# Patient Record
Sex: FEMALE | URBAN - METROPOLITAN AREA
[De-identification: names, ages, dates, MRNs, and addresses within clinical notes are randomized per-mention and may not be internally consistent; named-entity substitution may affect disease eponyms.]

---

## 2023-11-08 ENCOUNTER — INITIAL PRENATAL (OUTPATIENT)
Age: 28
End: 2023-11-08

## 2023-11-08 VITALS
HEART RATE: 73 BPM | BODY MASS INDEX: 34.31 KG/M2 | SYSTOLIC BLOOD PRESSURE: 120 MMHG | WEIGHT: 201 LBS | DIASTOLIC BLOOD PRESSURE: 60 MMHG | HEIGHT: 64 IN | OXYGEN SATURATION: 97 % | RESPIRATION RATE: 18 BRPM

## 2023-11-08 DIAGNOSIS — R11.2 NAUSEA AND VOMITING, UNSPECIFIED VOMITING TYPE: ICD-10-CM

## 2023-11-08 DIAGNOSIS — Z3A.08 8 WEEKS GESTATION OF PREGNANCY: Primary | ICD-10-CM

## 2023-11-08 RX ORDER — ONDANSETRON 4 MG/1
4 TABLET, ORALLY DISINTEGRATING ORAL 3 TIMES DAILY PRN
Qty: 21 TABLET | Refills: 3 | Status: SHIPPED | OUTPATIENT
Start: 2023-11-08

## 2023-11-08 RX ORDER — ASPIRIN 81 MG/1
81 TABLET ORAL DAILY
Qty: 90 TABLET | Refills: 1 | Status: SHIPPED | OUTPATIENT
Start: 2023-11-08

## 2023-11-08 SDOH — ECONOMIC STABILITY: FOOD INSECURITY: WITHIN THE PAST 12 MONTHS, THE FOOD YOU BOUGHT JUST DIDN'T LAST AND YOU DIDN'T HAVE MONEY TO GET MORE.: NEVER TRUE

## 2023-11-08 SDOH — ECONOMIC STABILITY: FOOD INSECURITY: WITHIN THE PAST 12 MONTHS, YOU WORRIED THAT YOUR FOOD WOULD RUN OUT BEFORE YOU GOT MONEY TO BUY MORE.: NEVER TRUE

## 2023-11-08 SDOH — ECONOMIC STABILITY: INCOME INSECURITY: HOW HARD IS IT FOR YOU TO PAY FOR THE VERY BASICS LIKE FOOD, HOUSING, MEDICAL CARE, AND HEATING?: NOT HARD AT ALL

## 2023-11-08 SDOH — ECONOMIC STABILITY: HOUSING INSECURITY
IN THE LAST 12 MONTHS, WAS THERE A TIME WHEN YOU DID NOT HAVE A STEADY PLACE TO SLEEP OR SLEPT IN A SHELTER (INCLUDING NOW)?: NO

## 2023-11-08 ASSESSMENT — ENCOUNTER SYMPTOMS
NAUSEA: 0
VOMITING: 0
EYES NEGATIVE: 1
RESPIRATORY NEGATIVE: 1
DIARRHEA: 0
BACK PAIN: 0

## 2023-11-08 ASSESSMENT — PATIENT HEALTH QUESTIONNAIRE - PHQ9
SUM OF ALL RESPONSES TO PHQ QUESTIONS 1-9: 0
2. FEELING DOWN, DEPRESSED OR HOPELESS: 0
SUM OF ALL RESPONSES TO PHQ QUESTIONS 1-9: 0
SUM OF ALL RESPONSES TO PHQ9 QUESTIONS 1 & 2: 0
1. LITTLE INTEREST OR PLEASURE IN DOING THINGS: 0

## 2023-11-09 LAB — SPECIMEN STATUS REPORT: NORMAL

## 2023-11-13 ENCOUNTER — TELEPHONE (OUTPATIENT)
Age: 28
End: 2023-11-13

## 2023-11-13 LAB
., LABCORP: ABNORMAL
C TRACH RRNA CVX QL NAA+PROBE: POSITIVE
CYTOLOGIST CVX/VAG CYTO: ABNORMAL
CYTOLOGY CVX/VAG DOC CYTO: ABNORMAL
CYTOLOGY CVX/VAG DOC THIN PREP: ABNORMAL
DX ICD CODE: ABNORMAL
Lab: ABNORMAL
N GONORRHOEA RRNA CVX QL NAA+PROBE: NEGATIVE
OTHER STN SPEC: ABNORMAL
STAT OF ADQ CVX/VAG CYTO-IMP: ABNORMAL
T VAGINALIS RRNA SPEC QL NAA+PROBE: POSITIVE

## 2023-11-13 RX ORDER — AZITHROMYCIN 500 MG/1
1000 TABLET, FILM COATED ORAL ONCE
Qty: 2 TABLET | Refills: 0 | Status: SHIPPED | OUTPATIENT
Start: 2023-11-13 | End: 2023-11-13

## 2023-11-13 RX ORDER — METRONIDAZOLE 500 MG/1
500 TABLET ORAL 2 TIMES DAILY
Qty: 14 TABLET | Refills: 0 | Status: SHIPPED | OUTPATIENT
Start: 2023-11-13 | End: 2023-11-20

## 2023-11-13 NOTE — TELEPHONE ENCOUNTER
----- Message from Sharon Greenwood MD sent at 11/13/2023  1:45 PM EST -----  Pap normal. Repeat 1 year. Trich positive: Flagyl 500 mg PO BID x 7d. Chlamydia positive: Azithromycin 1 gram PO x 1. Test of cure in 4 weeks.

## 2023-11-13 NOTE — TELEPHONE ENCOUNTER
Pt advised of +trich and +chlamydia. Per Dr. Anthony Vallejo rx for Flagyl and Azithromycin eprescribed. Pt advised to have partner treated and ANNE MARIE in 4 weeks. Pt verbalized understanding.

## 2023-11-21 DIAGNOSIS — Z34.90 PREGNANCY, UNSPECIFIED GESTATIONAL AGE: Primary | ICD-10-CM

## 2023-11-21 DIAGNOSIS — Z3A.08 8 WEEKS GESTATION OF PREGNANCY: ICD-10-CM

## 2023-11-21 LAB
ERYTHROCYTE [DISTWIDTH] IN BLOOD BY AUTOMATED COUNT: 14.7 % (ref 11.5–14.5)
HBV SURFACE AG SER QL: 0.48 INDEX
HBV SURFACE AG SER QL: NEGATIVE
HCT VFR BLD AUTO: 38 % (ref 35–47)
HEP B, EXTERNAL RESULT: NORMAL
HGB BLD-MCNC: 13 G/DL (ref 11.5–16)
HIV 1+2 AB+HIV1 P24 AG SERPL QL IA: NONREACTIVE
HIV 1/2 RESULT COMMENT: NORMAL
HIV, EXTERNAL RESULT: NORMAL
MCH RBC QN AUTO: 28.7 PG (ref 26–34)
MCHC RBC AUTO-ENTMCNC: 34.2 G/DL (ref 30–36.5)
MCV RBC AUTO: 83.9 FL (ref 80–99)
NRBC # BLD: 0 K/UL (ref 0–0.01)
NRBC BLD-RTO: 0 PER 100 WBC
PLATELET # BLD AUTO: 265 K/UL (ref 150–400)
PMV BLD AUTO: 11.8 FL (ref 8.9–12.9)
RBC # BLD AUTO: 4.53 M/UL (ref 3.8–5.2)
RPR, EXTERNAL RESULT: NORMAL
RUBELLA TITER, EXTERNAL RESULT: NORMAL
RUBV IGG SERPL IA-ACNC: NORMAL IU/ML
WBC # BLD AUTO: 8.6 K/UL (ref 3.6–11)

## 2023-11-22 LAB
ABO + RH BLD: NORMAL
BACTERIA SPEC CULT: NORMAL
BLOOD BANK CMNT PATIENT-IMP: NORMAL
BLOOD GROUP ANTIBODIES SERPL: NORMAL
CC UR VC: NORMAL
RPR SER QL: NONREACTIVE
SERVICE CMNT-IMP: NORMAL
SPECIMEN EXP DATE BLD: NORMAL

## 2023-11-22 RX ORDER — FAMOTIDINE 20 MG/1
20 TABLET, FILM COATED ORAL 2 TIMES DAILY
Qty: 180 TABLET | Refills: 1 | Status: SHIPPED | OUTPATIENT
Start: 2023-11-22

## 2023-11-22 RX ORDER — NITROFURANTOIN 25; 75 MG/1; MG/1
100 CAPSULE ORAL 2 TIMES DAILY
Qty: 14 CAPSULE | Refills: 0 | Status: SHIPPED | OUTPATIENT
Start: 2023-11-22 | End: 2023-11-29

## 2023-11-27 LAB
Lab: NORMAL
NTRA FETAL FRACTION: NORMAL
NTRA GENDER OF FETUS: NORMAL
NTRA MONOSOMY X AGE-BASED RISK TEXT: NORMAL
NTRA MONOSOMY X RESULT TEXT: NORMAL
NTRA MONOSOMY X RISK SCORE TEXT: NORMAL
NTRA TRIPLOIDY RESULT TEXT: NORMAL
NTRA TRISOMY 13 AGE-BASED RISK TEXT: NORMAL
NTRA TRISOMY 13 RESULT TEXT: NORMAL
NTRA TRISOMY 13 RISK SCORE TEXT: NORMAL
NTRA TRISOMY 18 AGE-BASED RISK TEXT: NORMAL
NTRA TRISOMY 18 RESULT TEXT: NORMAL
NTRA TRISOMY 18 RISK SCORE TEXT: NORMAL
NTRA TRISOMY 21 AGE-BASED RISK TEXT: NORMAL
NTRA TRISOMY 21 RESULT TEXT: NORMAL
NTRA TRISOMY 21 RISK SCORE TEXT: NORMAL

## 2023-12-05 LAB
Lab: NEGATIVE
Lab: NORMAL
NTRA ALPHA-THALASSEMIA: NEGATIVE
NTRA BETA-HEMOGLOBINOPATHIES: NEGATIVE
NTRA CANAVAN DISEASE: NEGATIVE
NTRA CYSTIC FIBROSIS: NEGATIVE
NTRA DUCHENNE/BECKER MUSCULAR DYSTROPHY: NEGATIVE
NTRA FAMILIAL DYSAUTONOMIA: NEGATIVE
NTRA FRAGILE X SYNDROME: NEGATIVE
NTRA GALACTOSEMIA: NEGATIVE
NTRA GAUCHER DISEASE: NEGATIVE
NTRA MEDIUM CHAIN ACYL-COA DEHYDROGENASE DEFICIENCY: NEGATIVE
NTRA POLYCYSTIC KIDNEY DISEASE, AUTOSOMAL RECESSIVE: NEGATIVE
NTRA SMITH-LEMLI-OPITZ SYNDROME: NEGATIVE
NTRA SPINAL MUSCULAR ATROPHY: NEGATIVE
NTRA TAY-SACHS DISEASE: NEGATIVE

## 2023-12-06 ENCOUNTER — ROUTINE PRENATAL (OUTPATIENT)
Age: 28
End: 2023-12-06

## 2023-12-06 VITALS — SYSTOLIC BLOOD PRESSURE: 130 MMHG | WEIGHT: 197 LBS | BODY MASS INDEX: 33.81 KG/M2 | DIASTOLIC BLOOD PRESSURE: 78 MMHG

## 2023-12-06 DIAGNOSIS — Z3A.08 8 WEEKS GESTATION OF PREGNANCY: Primary | ICD-10-CM

## 2023-12-06 DIAGNOSIS — Z23 INFLUENZA VACCINE ADMINISTERED: ICD-10-CM

## 2023-12-06 DIAGNOSIS — O21.9 NAUSEA AND VOMITING DURING PREGNANCY: ICD-10-CM

## 2023-12-06 RX ORDER — PROMETHAZINE HYDROCHLORIDE 25 MG/1
25 TABLET ORAL 4 TIMES DAILY PRN
Qty: 20 TABLET | Refills: 0 | Status: SHIPPED | OUTPATIENT
Start: 2023-12-06 | End: 2023-12-13

## 2023-12-06 RX ORDER — ONDANSETRON 4 MG/1
4 TABLET, ORALLY DISINTEGRATING ORAL 3 TIMES DAILY PRN
Qty: 21 TABLET | Refills: 3 | Status: SHIPPED | OUTPATIENT
Start: 2023-12-06

## 2023-12-06 NOTE — PROGRESS NOTES
Prenatal Visit    Chief Complaint:   Chief Complaint   Patient presents with    Routine Prenatal Visit       HPI:    Pablo Lomeli is a 29 y.o. [id number] More than one Race  Unavailable  female  at 12w2d weeks. Patient reports persistent nausea and vomiting despite taking famotidine and zofran. She wakes up nauseated and will vomit. Then will take her zofran and go back to sleep. She did not start diclegis. Completed her antibiotics for Chlamydia and Trich. ANNE MARIE with next visit. There was Negative fetal movements. The prenatal blood work testing was reviewed and found to be normal.  Dates were reviewed with the patient. OB History    Para Term  AB Living   5 3 3   1 3   SAB IAB Ectopic Molar Multiple Live Births     1       3      # Outcome Date GA Lbr Venkata/2nd Weight Sex Delivery Anes PTL Lv   5 Current            4 IAB            3 Term     M Vag-Spont   XIOMARA   2 Term     M Vag-Spont   XIOMARA   1 Term     M Vag-Spont   XIOMARA       Mother's Prenatal Vitals  BP: 130/78  Weight - Scale: 89.4 kg (197 lb)  Alb/Glu  Albumin: Negative  Glucose: Negative    Vitals:  Vitals:    23 1403   BP: 130/78        FHT: + on v-scan    Assessment:  Pablo Lomeli is a 29 y.o. Verlon Andrzej than one Race  Unavailable female at 12w2d weeks. Pregnancy complicated by  STD and persistent nausea/vomiting of pregnancy . Currently doing  stable . Plan:   - A single marker MSAFP will be ordered for a 15-20 week gestational age window. -  An 18-22 week anatomy ultrasound has been ordered. - Discussed expectations at current gestation. Tylenol/heat/ice rest for discomforts noted in pregnancy   - Labs needed:  ANNE MARIE for Chlamydia and trich. AFP next visit  -The patient will return to the office for her next visit in 4 weeks. - See antepartum flow sheet. - Refilled zofran and diclegis. Start promethazine for nausea. [place] diet.      Jose Armando Solis MD

## 2023-12-22 DIAGNOSIS — O21.9 NAUSEA AND VOMITING DURING PREGNANCY: ICD-10-CM

## 2023-12-22 DIAGNOSIS — Z3A.08 8 WEEKS GESTATION OF PREGNANCY: ICD-10-CM

## 2023-12-22 RX ORDER — ONDANSETRON 4 MG/1
4 TABLET, ORALLY DISINTEGRATING ORAL 3 TIMES DAILY PRN
Qty: 21 TABLET | Refills: 3 | OUTPATIENT
Start: 2023-12-22

## 2024-01-05 ENCOUNTER — ROUTINE PRENATAL (OUTPATIENT)
Age: 29
End: 2024-01-05

## 2024-01-05 VITALS — BODY MASS INDEX: 34.84 KG/M2 | DIASTOLIC BLOOD PRESSURE: 60 MMHG | WEIGHT: 203 LBS | SYSTOLIC BLOOD PRESSURE: 108 MMHG

## 2024-01-05 DIAGNOSIS — A59.9 TRICHOMONIASIS: ICD-10-CM

## 2024-01-05 DIAGNOSIS — Z11.3 SCREENING FOR STDS (SEXUALLY TRANSMITTED DISEASES): ICD-10-CM

## 2024-01-05 DIAGNOSIS — Z3A.08 8 WEEKS GESTATION OF PREGNANCY: ICD-10-CM

## 2024-01-05 DIAGNOSIS — Z34.90 PREGNANCY, UNSPECIFIED GESTATIONAL AGE: ICD-10-CM

## 2024-01-05 DIAGNOSIS — A74.9 CHLAMYDIA: ICD-10-CM

## 2024-01-05 DIAGNOSIS — Z3A.08 8 WEEKS GESTATION OF PREGNANCY: Primary | ICD-10-CM

## 2024-01-05 LAB
C. TRACHOMATIS, EXTERNAL RESULT: NORMAL
N. GONORRHOEAE, EXTERNAL RESULT: NORMAL
TSH SERPL DL<=0.05 MIU/L-ACNC: 0.84 UIU/ML (ref 0.36–3.74)

## 2024-01-05 NOTE — PROGRESS NOTES
Prenatal Visit    Chief Complaint:   Chief Complaint   Patient presents with    Routine Prenatal Visit       HPI:    Alejandrina Huang is a 28 y.o.  More than one Race  Unavailable  female  at 16w4d weeks. Patient reports she is feeling better. Nausea has resolved. There was Positive fetal movements. Patient reports no problems with contractions, vaginal bleeding or loss of fluid.   Due for ANNE MARIE today for Chlamydia and Trich. Also due for AFP.     The prenatal blood work testing was reviewed and found to be normal.      OB History    Para Term  AB Living   5 3 3   1 3   SAB IAB Ectopic Molar Multiple Live Births     1       3      # Outcome Date GA Lbr Venkata/2nd Weight Sex Delivery Anes PTL Lv   5 Current            4 IAB            3 Term     M Vag-Spont   XIOMARA   2 Term     M Vag-Spont   XIOMARA   1 Term     M Vag-Spont   XIOMARA       Mother's Prenatal Vitals  BP: 108/60  Weight - Scale: 92.1 kg (203 lb)  Alb/Glu  Albumin: Negative  Glucose: Negative    Vitals:  Vitals:    24 1400   BP: 108/60        FHT: 160    Assessment:  Alejandrina Huang is a 28 y.o. [id number] than one Race  Unavailable female at 16w4d weeks. . Currently doing well.      Plan:   Discussed expectations at current gestation. Tylenol/heat/ice rest for discomforts noted in pregnancy  Labs needed:  AFP, Nuswab  The patient will return to the office for her next visit in 4 weeks.   Anatomy US next visit.     Henrique Gonzales MD

## 2024-01-10 LAB
AFP INTERP SERPL-IMP: NORMAL
AFP MOM SERPL: 0.98
AFP SERPL-MCNC: 29.6 NG/ML
AGE AT DELIVERY: 29.1 YR
AGE OF EGG DONOR: NORMAL
AGE OF EGG DONOR: NORMAL
C TRACH RRNA SPEC QL NAA+PROBE: NEGATIVE
COMMENT: NORMAL
DONOR EGG?: 4
DONOR EGG?: NO
FAMILY HISTORY NTD: NORMAL
FHX NTD (Y OR N): NORMAL
GA METHOD: NORMAL
GA: 16.6 WEEKS
IDDM PATIENT QL: NO
INSULIN DEP. DIABETIC: NORMAL
Lab: 203
Lab: NORMAL
Lab: NORMAL
MAT SCN FOR FETAL ABNORMALITIES SERPL: NORMAL
MULTIPLE PREGNANCY: NO
N GONORRHOEA RRNA SPEC QL NAA+PROBE: NEGATIVE
NEURAL TUBE DEFECT RISK FETUS: NORMAL
NUMBER OF FETUSES: NO
OTHER INDICATIONS: NORMAL
OTHER INDICATIONS: NORMAL
PREVIOUSLY ELEVATED AFP (Y OR N): 16
PREVIOUSLY ELEVATED AFP (Y OR N): NO
PRIOR 1ST TRIM TESTING ?: NORMAL
PRIOR 2ND TRIM TESTING ?: NORMAL
PRIOR DS/NTD SCREEN CURRENT PREGNANCY?: NORMAL
PRIOR DS/NTD SCREEN CURRENT PREGNANCY?: NORMAL
PRIOR FIRST TRIMESTER TESTING (Y OR N ): NORMAL
PRIOR PREGNANCY WITH DOWN SYNDROME (Y OR N): 1
PRIOR PREGNANCY WITH DOWN SYNDROME (Y OR N): NORMAL
T VAGINALIS RRNA SPEC QL NAA+PROBE: NEGATIVE
TYPE OF EGG DONOR: NORMAL
TYPE OF EGG DONOR: NORMAL

## 2024-01-31 ENCOUNTER — ROUTINE PRENATAL (OUTPATIENT)
Age: 29
End: 2024-01-31

## 2024-01-31 VITALS — BODY MASS INDEX: 35.87 KG/M2 | DIASTOLIC BLOOD PRESSURE: 64 MMHG | WEIGHT: 209 LBS | SYSTOLIC BLOOD PRESSURE: 112 MMHG

## 2024-01-31 DIAGNOSIS — R11.0 NAUSEA: ICD-10-CM

## 2024-01-31 DIAGNOSIS — Z3A.08 8 WEEKS GESTATION OF PREGNANCY: Primary | ICD-10-CM

## 2024-01-31 PROCEDURE — 0502F SUBSEQUENT PRENATAL CARE: CPT | Performed by: OBSTETRICS & GYNECOLOGY

## 2024-01-31 RX ORDER — ONDANSETRON 4 MG/1
4 TABLET, ORALLY DISINTEGRATING ORAL 3 TIMES DAILY PRN
Qty: 21 TABLET | Refills: 0 | Status: SHIPPED | OUTPATIENT
Start: 2024-01-31

## 2024-01-31 NOTE — PROGRESS NOTES
Prenatal Visit    Chief Complaint:   Chief Complaint   Patient presents with    Routine Prenatal Visit       HPI:    Alejandrina Huang is a 28 y.o.  More than one Race  Unavailable  female  at 20w2d weeks. Patient reports Positive fetal movements. Patient reports no problems with contractions, vaginal bleeding or loss of fluid. Pt requests refill for zofran for intermittent nausea. Does note some headaches from time to time when she needs to eat.     OB History    Para Term  AB Living   5 3 3   1 3   SAB IAB Ectopic Molar Multiple Live Births     1       3      # Outcome Date GA Lbr Venkata/2nd Weight Sex Delivery Anes PTL Lv   5 Current            4 IAB            3 Term     M Vag-Spont   XIOMARA   2 Term     M Vag-Spont   XIOMARA   1 Term     M Vag-Spont   XIOMARA       Mother's Prenatal Vitals  BP: 112/64  Weight - Scale: 94.8 kg (209 lb)  Alb/Glu  Albumin: Negative  Glucose: Negative    Vitals:  Vitals:    24 1313   BP: 112/64        FHT: + on US       Fetal position: breech    Assessment:  Alejandrina Huang is a 28 y.o. [id number] than one Race  Unavailable female at 20w2d weeks.   Pregnancy complicated by treated STD, obesity.   Currently doing well.    Incomplete anatomy US: repeat in 4 weeks  Marginal cord insertion: Discussed finding and will do monitoring US at 32 wks.     Plan:   Discussed expectations at current gestation. Tylenol/heat/ice rest for discomforts noted in pregnancy  Discussed monitoring fetal movements and signs of  labor.   Labs needed today: None.  Glucola next visit.   The patient will return to the office for her next visit in 4 weeks.         Henrique Gonzales MD

## 2024-02-28 ENCOUNTER — ROUTINE PRENATAL (OUTPATIENT)
Age: 29
End: 2024-02-28

## 2024-02-28 VITALS — BODY MASS INDEX: 35.87 KG/M2 | DIASTOLIC BLOOD PRESSURE: 68 MMHG | WEIGHT: 209 LBS | SYSTOLIC BLOOD PRESSURE: 128 MMHG

## 2024-02-28 DIAGNOSIS — E66.9 CLASS 2 OBESITY WITHOUT SERIOUS COMORBIDITY WITH BODY MASS INDEX (BMI) OF 35.0 TO 35.9 IN ADULT, UNSPECIFIED OBESITY TYPE: ICD-10-CM

## 2024-02-28 DIAGNOSIS — O43.199 MARGINAL INSERTION OF UMBILICAL CORD AFFECTING MANAGEMENT OF MOTHER: ICD-10-CM

## 2024-02-28 DIAGNOSIS — Z3A.08 8 WEEKS GESTATION OF PREGNANCY: Primary | ICD-10-CM

## 2024-02-28 DIAGNOSIS — Z34.90 PREGNANCY, UNSPECIFIED GESTATIONAL AGE: ICD-10-CM

## 2024-02-28 PROCEDURE — 0502F SUBSEQUENT PRENATAL CARE: CPT | Performed by: OBSTETRICS & GYNECOLOGY

## 2024-02-28 NOTE — PROGRESS NOTES
Prenatal Visit    Chief Complaint:   Chief Complaint   Patient presents with    Routine Prenatal Visit       HPI:    Alejandrina Huang is a 28 y.o.  More than one Race  Unavailable  female  at 24w2d weeks. Patient reports Positive fetal movements. Patient reports no problems with contractions, vaginal bleeding or loss of fluid. Pt is having issues with insomnia. Pt was supposed to have follow up anatomy US today but missed her appt.     OB History    Para Term  AB Living   5 3 3   1 3   SAB IAB Ectopic Molar Multiple Live Births     1       3      # Outcome Date GA Lbr Venkata/2nd Weight Sex Delivery Anes PTL Lv   5 Current            4 IAB            3 Term     M Vag-Spont   XIOMARA   2 Term     M Vag-Spont   XIOMARA   1 Term     M Vag-Spont   XIOMARA       Mother's Prenatal Vitals  BP: 128/68  Weight - Scale: 94.8 kg (209 lb)    Vitals:  Vitals:    24 1411   BP: 128/68        Fundal Height: 24    FHT: 160    Assessment:  Alejandrina Huang is a 28 y.o. [id number] than one Race  Unavailable female at 24w2d weeks.   Pregnancy complicated by obesity and marginal cord insertion. .   Currently doing well.      Plan:   Discussed expectations at current gestation. Tylenol/heat/ice rest for discomforts noted in pregnancy  Discussed monitoring fetal movements and signs of  labor.   Labs needed: Glucola, HIV, and Syphilis testing  The patient will return to the office for her next visit in 2 weeks for follow up with repeat anatomy US.  US scheduled for 32 wk EFW/BPP. 34wk BPP, 36 wk EFW/BPP, 37-39wk BPP and 40 wk EFW/BPP for obesity and marginal cord insertion.   Discussed unisom PRN insomnia.          Henrique Gonzales MD

## 2024-02-29 LAB
ERYTHROCYTE [DISTWIDTH] IN BLOOD BY AUTOMATED COUNT: 15.1 % (ref 11.5–14.5)
GLUCOSE 1H P 100 G GLC PO SERPL-MCNC: 117 MG/DL (ref 65–140)
HCT VFR BLD AUTO: 33.1 % (ref 35–47)
HGB BLD-MCNC: 10.8 G/DL (ref 11.5–16)
HIV 1+2 AB+HIV1 P24 AG SERPL QL IA: NONREACTIVE
HIV 1/2 RESULT COMMENT: NORMAL
MCH RBC QN AUTO: 29.3 PG (ref 26–34)
MCHC RBC AUTO-ENTMCNC: 32.6 G/DL (ref 30–36.5)
MCV RBC AUTO: 89.7 FL (ref 80–99)
NRBC # BLD: 0 K/UL (ref 0–0.01)
NRBC BLD-RTO: 0 PER 100 WBC
PLATELET # BLD AUTO: 263 K/UL (ref 150–400)
PMV BLD AUTO: 11.3 FL (ref 8.9–12.9)
RBC # BLD AUTO: 3.69 M/UL (ref 3.8–5.2)
WBC # BLD AUTO: 6.3 K/UL (ref 3.6–11)

## 2024-03-01 ENCOUNTER — HOSPITAL ENCOUNTER (EMERGENCY)
Facility: HOSPITAL | Age: 29
Discharge: HOME | End: 2024-03-01
Payer: MEDICAID

## 2024-03-01 VITALS
OXYGEN SATURATION: 98 % | HEIGHT: 64 IN | BODY MASS INDEX: 35.68 KG/M2 | TEMPERATURE: 98.6 F | WEIGHT: 209 LBS | SYSTOLIC BLOOD PRESSURE: 145 MMHG | DIASTOLIC BLOOD PRESSURE: 76 MMHG | RESPIRATION RATE: 18 BRPM | HEART RATE: 92 BPM

## 2024-03-01 DIAGNOSIS — J20.8 ACUTE VIRAL BRONCHITIS: Primary | ICD-10-CM

## 2024-03-01 DIAGNOSIS — O99.332 HIGH RISK PREGNANCY DUE TO SMOKING IN SECOND TRIMESTER: ICD-10-CM

## 2024-03-01 DIAGNOSIS — O16.2 ELEVATED BLOOD PRESSURE AFFECTING PREGNANCY IN SECOND TRIMESTER, ANTEPARTUM: ICD-10-CM

## 2024-03-01 LAB — T PALLIDUM AB SER QL IA: NON REACTIVE

## 2024-03-01 PROCEDURE — 99283 EMERGENCY DEPT VISIT LOW MDM: CPT

## 2024-03-01 RX ORDER — ACETAMINOPHEN 325 MG/1
650 TABLET ORAL EVERY 6 HOURS PRN
Qty: 20 TABLET | Refills: 0 | Status: SHIPPED | OUTPATIENT
Start: 2024-03-01

## 2024-03-01 RX ORDER — DIPHENHYDRAMINE HCL 25 MG
25 CAPSULE ORAL EVERY 6 HOURS PRN
Qty: 20 CAPSULE | Refills: 0 | Status: SHIPPED | OUTPATIENT
Start: 2024-03-01 | End: 2024-03-11

## 2024-03-01 RX ORDER — GUAIFENESIN/DEXTROMETHORPHAN 100-10MG/5
5 SYRUP ORAL 3 TIMES DAILY PRN
Qty: 120 ML | Refills: 0 | Status: SHIPPED | OUTPATIENT
Start: 2024-03-01 | End: 2024-03-11

## 2024-03-01 ASSESSMENT — VISUAL ACUITY: OU: 1

## 2024-03-01 ASSESSMENT — PAIN - FUNCTIONAL ASSESSMENT: PAIN_FUNCTIONAL_ASSESSMENT: NONE - DENIES PAIN

## 2024-03-01 ASSESSMENT — ENCOUNTER SYMPTOMS: COUGH: 1

## 2024-03-01 NOTE — ED NOTES
Pt presents ambulatory to ED complaining of N/V/D, cough, ear pain, nasal congestion, and chest congestion that started Tuesday. Pt reports being around someone who had the flu. Pt reports being 24 weeks pregnant and states she has an OBGYN and takes prenatals. Pt is alert and oriented x 4, RR even and unlabored, skin is warm and dry. Assesment completed and pt updated on plan of care.       Emergency Department Nursing Plan of Care       The Nursing Plan of Care is developed from the Nursing assessment and Emergency Department Attending provider initial evaluation.  The plan of care may be reviewed in the “ED Provider note”.        Signed     LAUREN INFANTE RN    3/1/2024   5:18 PM

## 2024-03-01 NOTE — ED NOTES
Discharge instructions were given to the patient by LAUREN INFANTE RN.     The patient left the Emergency Department ambulatory, alert and oriented and in no acute distress with 3 prescriptions. The patient was encouraged to call or return to the ED for worsening issues or problems and was encouraged to schedule a follow up appointment for continuing care.     The patient verbalized understanding of discharge instructions and prescriptions, all questions were answered. The patient has no further concerns at this time.

## 2024-03-01 NOTE — ED PROVIDER NOTES
[facility] EMERGENCY DEPT  EMERGENCY DEPARTMENT ENCOUNTER       Pt Name: Alejandrina Huang  MRN: [id number]  Birthdate 1995  Date of evaluation: 3/1/2024  Provider: VANDANA Bee   PCP: None, None  Note Started: 5:26 PM EST 3/1/24     CHIEF COMPLAINT       Chief Complaint   Patient presents with    Influenza        HISTORY OF PRESENT ILLNESS: 1 or more elements      History From: Patient  HPI Limitations: None     Alejandrina Huang is a 28 y.o. female who presents ambulatory with her boyfriend with 3 to 4 days of bodyaches, cough and congestion.  Her boyfriend has had similar symptoms.  Patient is about 24 weeks pregnant.  She smokes cigarettes.     Nursing Notes were all reviewed and agreed with or any disagreements were addressed in the HPI.     REVIEW OF SYSTEMS      Review of Systems   HENT:  Positive for congestion.    Respiratory:  Positive for cough.    Musculoskeletal:  Positive for myalgias.        Positives and Pertinent negatives as per HPI.    PAST HISTORY     Past Medical History:  Past Medical History:   Diagnosis Date    Chlamydia 2023    Obesity     Trichomonal cervicitis 2023       Past Surgical History:  Past Surgical History:   Procedure Laterality Date    DILATION AND CURETTAGE OF UTERUS      Done after 2nd delivery due to retained placenta    THERAPEUTIC          Family History:  No family history on file.    Social History:  Social History     Tobacco Use    Smoking status: Former     Types: Cigarettes    Smokeless tobacco: Never   Vaping Use    Vaping Use: Every day   Substance Use Topics    Alcohol use: Not Currently    Drug use: Never       Allergies:  No Known Allergies    CURRENT MEDICATIONS      Previous Medications    ASPIRIN 81 MG EC TABLET    Take 1 tablet by mouth daily    FAMOTIDINE (PEPCID) 20 MG TABLET    Take 1 tablet by mouth 2 times daily    ONDANSETRON (ZOFRAN-ODT) 4 MG DISINTEGRATING TABLET    Take 1 tablet by mouth 3 times daily as needed for Nausea or Vomiting     [phone]   acetaminophen 325 MG tablet  diphenhydrAMINE 25 MG capsule  guaiFENesin-dextromethorphan 100-10 MG/5ML syrup           DISCONTINUED MEDICATIONS:  Current Discharge Medication List        I have seen and evaluated the patient autonomously. My supervision physician was on site and available for consultation if needed.     I am the Primary Clinician of Record.   VANDANA Bee (electronically signed)    (Please note that parts of this dictation were completed with voice recognition software. Quite often unanticipated grammatical, syntax, homophones, and other interpretive errors are inadvertently transcribed by the computer software. Please disregards these errors. Please excuse any errors that have escaped final proofreading.)       Dani Gonzalez PA  03/01/24 2544

## 2024-03-08 RX ORDER — ONDANSETRON 4 MG/1
4 TABLET, ORALLY DISINTEGRATING ORAL 3 TIMES DAILY PRN
Qty: 21 TABLET | Refills: 0 | Status: SHIPPED | OUTPATIENT
Start: 2024-03-08

## 2024-03-13 ENCOUNTER — ROUTINE PRENATAL (OUTPATIENT)
Age: 29
End: 2024-03-13

## 2024-03-13 VITALS — SYSTOLIC BLOOD PRESSURE: 118 MMHG | BODY MASS INDEX: 36.9 KG/M2 | WEIGHT: 215 LBS | DIASTOLIC BLOOD PRESSURE: 62 MMHG

## 2024-03-13 DIAGNOSIS — O43.199 MARGINAL INSERTION OF UMBILICAL CORD AFFECTING MANAGEMENT OF MOTHER: ICD-10-CM

## 2024-03-13 DIAGNOSIS — Z3A.08 8 WEEKS GESTATION OF PREGNANCY: Primary | ICD-10-CM

## 2024-03-13 DIAGNOSIS — E66.9 CLASS 2 OBESITY WITHOUT SERIOUS COMORBIDITY WITH BODY MASS INDEX (BMI) OF 35.0 TO 35.9 IN ADULT, UNSPECIFIED OBESITY TYPE: ICD-10-CM

## 2024-03-13 PROCEDURE — 0502F SUBSEQUENT PRENATAL CARE: CPT | Performed by: OBSTETRICS & GYNECOLOGY

## 2024-03-13 NOTE — PROGRESS NOTES
Prenatal Visit    Chief Complaint:   Chief Complaint   Patient presents with    Routine Prenatal Visit       HPI:    Alejandrina Huang is a 28 y.o.  More than one Race  Unavailable  female  at 26w2d weeks. Patient reports Positive fetal movements. Patient reports no problems with contractions, vaginal bleeding or loss of fluid. Pt had follow up anatomy US today that was completed and normal. Pt is interested in getting tdap and RSV vaccines. Discussed we can do tdap at next visit and RSV can get obtained from [facility].     OB History    Para Term  AB Living   5 3 3   1 3   SAB IAB Ectopic Molar Multiple Live Births     1       3      # Outcome Date GA Lbr Venkata/2nd Weight Sex Delivery Anes PTL Lv   5 Current            4 IAB            3 Term     M Vag-Spont   XIOMARA   2 Term     M Vag-Spont   XIOMARA   1 Term     M Vag-Spont   XIOMARA       Mother's Prenatal Vitals  BP: 118/62  Weight - Scale: 97.5 kg (215 lb)  Alb/Glu  Albumin: Negative  Glucose: Negative    Vitals:  Vitals:    24 1256   BP: 118/62        Fundal Height: 27    FHT: + on US      Fetal position: breech    Assessment:  Alejandrina Huang is a 28 y.o. [id number] than one Race  Unavailable female at 26w2d weeks.   Pregnancy complicated by obesity and marginal cord insertion.   Currently doing well.      Plan:   Discussed expectations at current gestation. Tylenol/heat/ice rest for discomforts noted in pregnancy  Discussed monitoring fetal movements and signs of  labor.   Labs needed: None  The patient will return to the office for her next visit in 4 weeks.   Monitoring US starting at 32 wks.         Henrique Gonzales MD

## 2024-03-27 RX ORDER — ONDANSETRON 4 MG/1
4 TABLET, ORALLY DISINTEGRATING ORAL 3 TIMES DAILY PRN
Qty: 21 TABLET | Refills: 0 | Status: SHIPPED | OUTPATIENT
Start: 2024-03-27

## 2024-03-29 ENCOUNTER — ROUTINE PRENATAL (OUTPATIENT)
Age: 29
End: 2024-03-29
Payer: MEDICAID

## 2024-03-29 VITALS — WEIGHT: 210 LBS | BODY MASS INDEX: 36.05 KG/M2 | SYSTOLIC BLOOD PRESSURE: 108 MMHG | DIASTOLIC BLOOD PRESSURE: 60 MMHG

## 2024-03-29 DIAGNOSIS — Z34.90 PREGNANCY, UNSPECIFIED GESTATIONAL AGE: ICD-10-CM

## 2024-03-29 DIAGNOSIS — Z3A.08 8 WEEKS GESTATION OF PREGNANCY: Primary | ICD-10-CM

## 2024-03-29 DIAGNOSIS — O43.199 MARGINAL INSERTION OF UMBILICAL CORD AFFECTING MANAGEMENT OF MOTHER: ICD-10-CM

## 2024-03-29 PROCEDURE — 0502F SUBSEQUENT PRENATAL CARE: CPT | Performed by: OBSTETRICS & GYNECOLOGY

## 2024-03-29 PROCEDURE — 90715 TDAP VACCINE 7 YRS/> IM: CPT | Performed by: OBSTETRICS & GYNECOLOGY

## 2024-03-29 PROCEDURE — 90471 IMMUNIZATION ADMIN: CPT | Performed by: OBSTETRICS & GYNECOLOGY

## 2024-03-29 NOTE — PROGRESS NOTES
Prenatal Visit    Chief Complaint:   Chief Complaint   Patient presents with    Routine Prenatal Visit       HPI:    Alejandrina Huang is a 28 y.o.  More than one Race  Unavailable  female  at 28w4d weeks. Patient reports Positive fetal movements. Patient reports no problems with contractions, vaginal bleeding or loss of fluid. Discussed getting tdap today. Pt agrees. Pt to get belly band order from her insurance sent to the office.     OB History    Para Term  AB Living   5 3 3   1 3   SAB IAB Ectopic Molar Multiple Live Births     1       3      # Outcome Date GA Lbr Venkata/2nd Weight Sex Delivery Anes PTL Lv   5 Current            4 IAB            3 Term     M Vag-Spont   XIOMARA   2 Term     M Vag-Spont   XIOMARA   1 Term     M Vag-Spont   XIOMRAA       Mother's Prenatal Vitals  BP: 108/60  Weight - Scale: 95.3 kg (210 lb)  Alb/Glu  Albumin: Negative  Glucose: Negative    Vitals:  Vitals:    24 1328   BP: 108/60        Fundal Height: 28    FHT: 150      Assessment:  Alejandrina Huang is a 28 y.o. [id number] than one Race  Unavailable female at 28w4d weeks.   Pregnancy complicated by obesity.   Currently doing well.      Plan:   Discussed expectations at current gestation. Tylenol/heat/ice rest for discomforts noted in pregnancy  Discussed monitoring fetal movements and signs of  labor.   Labs needed: None  The patient will return to the office for her next visit in 2 weeks.  Monitoring US start at 32 weeks  Tdap today.          Henrique Gonzales MD

## 2024-04-01 ENCOUNTER — TELEPHONE (OUTPATIENT)
Age: 29
End: 2024-04-01

## 2024-04-01 NOTE — TELEPHONE ENCOUNTER
Call patient to inform her of Dr Gonzales's schedule change on May 8. Informed patient she could come to [facility] to stay on schedule for ultrasound and follow up, with midwife. Left detailed message with call back number.

## 2024-04-10 RX ORDER — ONDANSETRON 4 MG/1
4 TABLET, ORALLY DISINTEGRATING ORAL 3 TIMES DAILY PRN
Qty: 21 TABLET | Refills: 1 | OUTPATIENT
Start: 2024-04-10

## 2024-04-12 ENCOUNTER — ROUTINE PRENATAL (OUTPATIENT)
Age: 29
End: 2024-04-12

## 2024-04-12 VITALS — SYSTOLIC BLOOD PRESSURE: 120 MMHG | WEIGHT: 213 LBS | BODY MASS INDEX: 36.56 KG/M2 | DIASTOLIC BLOOD PRESSURE: 70 MMHG

## 2024-04-12 DIAGNOSIS — E66.9 CLASS 2 OBESITY WITHOUT SERIOUS COMORBIDITY WITH BODY MASS INDEX (BMI) OF 36.0 TO 36.9 IN ADULT, UNSPECIFIED OBESITY TYPE: ICD-10-CM

## 2024-04-12 DIAGNOSIS — Z3A.08 8 WEEKS GESTATION OF PREGNANCY: Primary | ICD-10-CM

## 2024-04-12 DIAGNOSIS — O43.199 MARGINAL INSERTION OF UMBILICAL CORD AFFECTING MANAGEMENT OF MOTHER: ICD-10-CM

## 2024-04-12 RX ORDER — ONDANSETRON 4 MG/1
4 TABLET, ORALLY DISINTEGRATING ORAL 3 TIMES DAILY PRN
Qty: 21 TABLET | Refills: 0 | Status: SHIPPED | OUTPATIENT
Start: 2024-04-12

## 2024-04-12 NOTE — PROGRESS NOTES
Prenatal Visit    Chief Complaint:   Chief Complaint   Patient presents with    Routine Prenatal Visit       HPI:    Alejandrina Huang is a 28 y.o.  More than one Race  Unavailable  female  at 30w4d weeks. Patient reports Positive fetal movements. Patient reports no problems with contractions, vaginal bleeding or loss of fluid. Needs refill for zofran. Sent this morning.     OB History    Para Term  AB Living   5 3 3   1 3   SAB IAB Ectopic Molar Multiple Live Births     1       3      # Outcome Date GA Lbr Venkata/2nd Weight Sex Delivery Anes PTL Lv   5 Current            4 IAB            3 Term     M Vag-Spont   XIOMARA   2 Term     M Vag-Spont   XIOMARA   1 Term     M Vag-Spont   XIOMARA       Mother's Prenatal Vitals  BP: 120/70  Weight - Scale: 96.6 kg (213 lb)  Prenatal Fetal Information  Fundal Height (cm): 30 cm  Fetal HR: 155  Movement: Present    Cervix check: no      Fetal position: uncertain lie    Assessment:  Alejandrina Huang is a 28 y.o. [id number] than one Race  Unavailable female at 30w4d weeks.   Pregnancy complicated by obesity, marginal cord insertion.   Currently doing well.      Plan:   Discussed expectations at current gestation. Tylenol/heat/ice rest for discomforts noted in pregnancy  Discussed monitoring fetal movements and signs of labor.   Labs needed: None  Ultrasound ordered: yes - BPP/EFW for obesity and marginal cord insertion  The patient will return to the office for her next visit in 2 weeks.       Henrique Gonzales MD

## 2024-04-24 ENCOUNTER — ROUTINE PRENATAL (OUTPATIENT)
Age: 29
End: 2024-04-24

## 2024-04-24 VITALS — BODY MASS INDEX: 37.25 KG/M2 | WEIGHT: 217 LBS | DIASTOLIC BLOOD PRESSURE: 72 MMHG | SYSTOLIC BLOOD PRESSURE: 126 MMHG

## 2024-04-24 DIAGNOSIS — O43.199 MARGINAL INSERTION OF UMBILICAL CORD AFFECTING MANAGEMENT OF MOTHER: ICD-10-CM

## 2024-04-24 DIAGNOSIS — Z3A.08 8 WEEKS GESTATION OF PREGNANCY: Primary | ICD-10-CM

## 2024-04-24 DIAGNOSIS — E66.9 CLASS 2 OBESITY WITHOUT SERIOUS COMORBIDITY WITH BODY MASS INDEX (BMI) OF 36.0 TO 36.9 IN ADULT, UNSPECIFIED OBESITY TYPE: ICD-10-CM

## 2024-04-24 PROCEDURE — 0502F SUBSEQUENT PRENATAL CARE: CPT | Performed by: OBSTETRICS & GYNECOLOGY

## 2024-04-24 RX ORDER — ONDANSETRON 4 MG/1
4 TABLET, ORALLY DISINTEGRATING ORAL 3 TIMES DAILY PRN
Qty: 21 TABLET | Refills: 0 | Status: SHIPPED | OUTPATIENT
Start: 2024-04-24

## 2024-04-24 NOTE — PROGRESS NOTES
Prenatal Visit    Chief Complaint:   Chief Complaint   Patient presents with    Routine Prenatal Visit       HPI:    Alejandrina Huang is a 29 y.o.  More than one Race  Unavailable  female  at 32w2d weeks. Patient reports Positive fetal movements. Patient reports no problems with contractions, vaginal bleeding or loss of fluid.   US today showed BPP 8/8, EFW @ 54th %tile Unstable lie noted.     OB History    Para Term  AB Living   5 3 3   1 3   SAB IAB Ectopic Molar Multiple Live Births     1       3      # Outcome Date GA Lbr Venkata/2nd Weight Sex Delivery Anes PTL Lv   5 Current            4 IAB            3 Term     M Vag-Spont   XIOMARA   2 Term     M Vag-Spont   XIOMARA   1 Term     M Vag-Spont   XIOMARA       Mother's Prenatal Vitals  BP: 126/72  Weight - Scale: 98.4 kg (217 lb)  Alb/Glu  Albumin: Negative  Glucose: Negative    Cervix check: no      Fetal position:  unstable    Assessment:  Alejandrina Huang is a 29 y.o. [id number] than one Race  Unavailable female at 32w2d weeks.   Pregnancy complicated by obesity, marginal cord insertion. .   Currently doing well.      Plan:   Discussed expectations at current gestation. Tylenol/heat/ice rest for discomforts noted in pregnancy  Discussed monitoring fetal movements and signs of labor.   Labs needed: None  Ultrasound ordered: yes - BPP for marginal cord insertion.   The patient will return to the office for her next visit in 2 weeks.       Henrique Gonzales MD

## 2024-05-08 ENCOUNTER — ROUTINE PRENATAL (OUTPATIENT)
Age: 29
End: 2024-05-08

## 2024-05-08 VITALS — SYSTOLIC BLOOD PRESSURE: 128 MMHG | WEIGHT: 216 LBS | BODY MASS INDEX: 37.08 KG/M2 | DIASTOLIC BLOOD PRESSURE: 80 MMHG

## 2024-05-08 DIAGNOSIS — Z3A.34 34 WEEKS GESTATION OF PREGNANCY: Primary | ICD-10-CM

## 2024-05-08 DIAGNOSIS — Z3A.08 8 WEEKS GESTATION OF PREGNANCY: ICD-10-CM

## 2024-05-08 PROCEDURE — 0502F SUBSEQUENT PRENATAL CARE: CPT | Performed by: ADVANCED PRACTICE MIDWIFE

## 2024-05-08 RX ORDER — ASPIRIN 81 MG/1
81 TABLET ORAL DAILY
Qty: 90 TABLET | Refills: 1 | Status: SHIPPED | OUTPATIENT
Start: 2024-05-08

## 2024-05-08 RX ORDER — ONDANSETRON 4 MG/1
4 TABLET, ORALLY DISINTEGRATING ORAL 3 TIMES DAILY PRN
Qty: 21 TABLET | Refills: 0 | Status: CANCELLED | OUTPATIENT
Start: 2024-05-08

## 2024-05-08 RX ORDER — ONDANSETRON 4 MG/1
4 TABLET, ORALLY DISINTEGRATING ORAL 3 TIMES DAILY PRN
Qty: 21 TABLET | Refills: 1 | Status: SHIPPED | OUTPATIENT
Start: 2024-05-08

## 2024-05-08 NOTE — PROGRESS NOTES
[facility]:  Alejandrina Huang is a 29 y.o.  at 34w2d  who presents for routine OB appointment    Chief Complaint   Patient presents with    Routine Prenatal Visit        DERRICK Holloway CNM    /80   Wt 98 kg (216 lb)   LMP 09/10/2023   BMI 37.08 kg/m²     FH: 35      ABO: A POSITIVE     RhoGAM: N/A     Subjective: Doing well, no complaints. Good FM. Denies vaginal bleeding, Leaking of fluid, regular contractions.    US today:  LIMITED OB SCAN A SINGLE VERTEX 34W2D IUP IS SEEN. FETAL CARDIAC MOTION OBSERVED. LIMITED ANATOMY WAS VISUALIZED AND APPEARS WNL. BPP= 8/8 CHARLI= 23.5 CM PLACENTA APPEARS WITHIN NORMAL LIMITS.    We discussed to decrease carbs and increase protein.   Third trimester precautions given.   labor precautions reviewed.     Follow up in 2 weeks.     DERRICK Holloway CNM

## 2024-05-22 ENCOUNTER — ROUTINE PRENATAL (OUTPATIENT)
Age: 29
End: 2024-05-22

## 2024-05-22 VITALS — WEIGHT: 218 LBS | SYSTOLIC BLOOD PRESSURE: 132 MMHG | BODY MASS INDEX: 37.42 KG/M2 | DIASTOLIC BLOOD PRESSURE: 78 MMHG

## 2024-05-22 DIAGNOSIS — O40.3XX0 POLYHYDRAMNIOS IN THIRD TRIMESTER COMPLICATION, SINGLE OR UNSPECIFIED FETUS: ICD-10-CM

## 2024-05-22 DIAGNOSIS — Z3A.08 8 WEEKS GESTATION OF PREGNANCY: ICD-10-CM

## 2024-05-22 DIAGNOSIS — O43.199 MARGINAL INSERTION OF UMBILICAL CORD AFFECTING MANAGEMENT OF MOTHER: ICD-10-CM

## 2024-05-22 DIAGNOSIS — Z34.90 PREGNANCY, UNSPECIFIED GESTATIONAL AGE: Primary | ICD-10-CM

## 2024-05-22 LAB — GBS, EXTERNAL RESULT: NORMAL

## 2024-05-22 PROCEDURE — 0502F SUBSEQUENT PRENATAL CARE: CPT | Performed by: OBSTETRICS & GYNECOLOGY

## 2024-05-22 ASSESSMENT — PATIENT HEALTH QUESTIONNAIRE - PHQ9
SUM OF ALL RESPONSES TO PHQ QUESTIONS 1-9: 0
SUM OF ALL RESPONSES TO PHQ9 QUESTIONS 1 & 2: 0
SUM OF ALL RESPONSES TO PHQ QUESTIONS 1-9: 0
SUM OF ALL RESPONSES TO PHQ QUESTIONS 1-9: 0
1. LITTLE INTEREST OR PLEASURE IN DOING THINGS: NOT AT ALL
SUM OF ALL RESPONSES TO PHQ QUESTIONS 1-9: 0
2. FEELING DOWN, DEPRESSED OR HOPELESS: NOT AT ALL

## 2024-05-22 NOTE — PROGRESS NOTES
Prenatal Visit    Chief Complaint:   Chief Complaint   Patient presents with    Routine Prenatal Visit       HPI:    Alejandrina Huang is a 29 y.o.  More than one Race  Unavailable  female  at 36w2d weeks. Patient reports Positive fetal movements. Patient reports britney christensen contractions. US today showed EFW @ 6#4 (45th %tile), BPP 8/8 with CHARLI of 25.     OB History    Para Term  AB Living   5 3 3   1 3   SAB IAB Ectopic Molar Multiple Live Births     1       3      # Outcome Date GA Lbr Venkata/2nd Weight Sex Delivery Anes PTL Lv   5 Current            4 IAB            3 Term     M Vag-Spont   XIOMARA   2 Term     M Vag-Spont   XIOMARA   1 Term     M Vag-Spont   XIOMARA       Mother's Prenatal Vitals  BP: 132/78  Weight - Scale: 98.9 kg (218 lb)  Alb/Glu  Albumin: Negative  Glucose: Negative  Prenatal Fetal Information  Fetal HR: + on US  Movement: Present  Presentation: Vertex  Dil/Eff/Sta  Dilation (cm): 1  Effacement: 50  Station: -3    Cervix check: yes - 1/50/-3      Fetal position: vertex    Assessment:  Alejandrina Huang is a 29 y.o. [id number] than one Race  Unavailable female at 36w2d weeks.   Pregnancy complicated by marginal cord insertion and polyhydramnios.   Currently doing well.      Plan:   Discussed expectations at current gestation. Tylenol/heat/ice rest for discomforts noted in pregnancy  Discussed monitoring fetal movements and signs of labor.   Labs needed: GBS  Ultrasound ordered: yes - BPP for marginal cord insertion  The patient will return to the office for her next visit in 1 weeks.  Labor precautions.        Henrique Gonzales MD

## 2024-05-24 LAB — GP B STREP DNA SPEC QL NAA+PROBE: NEGATIVE

## 2024-05-27 ENCOUNTER — HOSPITAL ENCOUNTER (INPATIENT)
Facility: HOSPITAL | Age: 29
LOS: 2 days | Discharge: HOME | DRG: 560 | End: 2024-05-29
Attending: MIDWIFE | Admitting: OBSTETRICS & GYNECOLOGY
Payer: MEDICAID

## 2024-05-27 ENCOUNTER — ANESTHESIA EVENT (OUTPATIENT)
Facility: HOSPITAL | Age: 29
DRG: 560 | End: 2024-05-27
Payer: MEDICAID

## 2024-05-27 ENCOUNTER — ANESTHESIA (OUTPATIENT)
Facility: HOSPITAL | Age: 29
DRG: 560 | End: 2024-05-27
Payer: MEDICAID

## 2024-05-27 DIAGNOSIS — R10.9 ABDOMINAL PAIN DURING PREGNANCY IN THIRD TRIMESTER: ICD-10-CM

## 2024-05-27 DIAGNOSIS — O26.893 ABDOMINAL PAIN DURING PREGNANCY IN THIRD TRIMESTER: ICD-10-CM

## 2024-05-27 DIAGNOSIS — Z3A.37 37 WEEKS GESTATION OF PREGNANCY: Primary | ICD-10-CM

## 2024-05-27 LAB
BASOPHILS # BLD: 0 K/UL (ref 0–0.1)
BASOPHILS NFR BLD: 0 % (ref 0–1)
DIFFERENTIAL METHOD BLD: ABNORMAL
EOSINOPHIL # BLD: 0.1 K/UL (ref 0–0.4)
EOSINOPHIL NFR BLD: 2 % (ref 0–7)
ERYTHROCYTE [DISTWIDTH] IN BLOOD BY AUTOMATED COUNT: 15 % (ref 11.5–14.5)
HCT VFR BLD AUTO: 31.5 % (ref 35–47)
HGB BLD-MCNC: 10.7 G/DL (ref 11.5–16)
IMM GRANULOCYTES # BLD AUTO: 0 K/UL (ref 0–0.04)
IMM GRANULOCYTES NFR BLD AUTO: 1 % (ref 0–0.5)
LYMPHOCYTES # BLD: 2.2 K/UL (ref 0.8–3.5)
LYMPHOCYTES NFR BLD: 25 % (ref 12–49)
MCH RBC QN AUTO: 28.5 PG (ref 26–34)
MCHC RBC AUTO-ENTMCNC: 34 G/DL (ref 30–36.5)
MCV RBC AUTO: 84 FL (ref 80–99)
MONOCYTES # BLD: 0.9 K/UL (ref 0–1)
MONOCYTES NFR BLD: 10 % (ref 5–13)
NEUTS SEG # BLD: 5.6 K/UL (ref 1.8–8)
NEUTS SEG NFR BLD: 62 % (ref 32–75)
NRBC # BLD: 0 K/UL (ref 0–0.01)
NRBC BLD-RTO: 0 PER 100 WBC
PLATELET # BLD AUTO: 276 K/UL (ref 150–400)
PMV BLD AUTO: 11.2 FL (ref 8.9–12.9)
RBC # BLD AUTO: 3.75 M/UL (ref 3.8–5.2)
RPR SER QL: NONREACTIVE
WBC # BLD AUTO: 8.9 K/UL (ref 3.6–11)

## 2024-05-27 PROCEDURE — 59400 OBSTETRICAL CARE: CPT | Performed by: MIDWIFE

## 2024-05-27 PROCEDURE — 2500000003 HC RX 250 WO HCPCS: Performed by: STUDENT IN AN ORGANIZED HEALTH CARE EDUCATION/TRAINING PROGRAM

## 2024-05-27 PROCEDURE — 3700000025 EPIDURAL BLOCK: Performed by: ANESTHESIOLOGY

## 2024-05-27 PROCEDURE — 85025 COMPLETE CBC W/AUTO DIFF WBC: CPT

## 2024-05-27 PROCEDURE — APPNB30 APP NON BILLABLE TIME 0-30 MINS: Performed by: MIDWIFE

## 2024-05-27 PROCEDURE — 1120000000 HC RM PRIVATE OB

## 2024-05-27 PROCEDURE — 2580000003 HC RX 258: Performed by: ADVANCED PRACTICE MIDWIFE

## 2024-05-27 PROCEDURE — 7210000100 HC LABOR FEE PER 1 HR

## 2024-05-27 PROCEDURE — 4A1HXCZ MONITORING OF PRODUCTS OF CONCEPTION, CARDIAC RATE, EXTERNAL APPROACH: ICD-10-PCS | Performed by: OBSTETRICS & GYNECOLOGY

## 2024-05-27 PROCEDURE — 51701 INSERT BLADDER CATHETER: CPT

## 2024-05-27 PROCEDURE — 6370000000 HC RX 637 (ALT 250 FOR IP): Performed by: OBSTETRICS & GYNECOLOGY

## 2024-05-27 PROCEDURE — 99284 EMERGENCY DEPT VISIT MOD MDM: CPT

## 2024-05-27 PROCEDURE — 2580000003 HC RX 258: Performed by: MIDWIFE

## 2024-05-27 PROCEDURE — 86592 SYPHILIS TEST NON-TREP QUAL: CPT

## 2024-05-27 PROCEDURE — 6370000000 HC RX 637 (ALT 250 FOR IP): Performed by: MIDWIFE

## 2024-05-27 PROCEDURE — 4500000002 HC ER NO CHARGE

## 2024-05-27 PROCEDURE — 7100000000 HC PACU RECOVERY - FIRST 15 MIN

## 2024-05-27 PROCEDURE — 6360000002 HC RX W HCPCS: Performed by: MIDWIFE

## 2024-05-27 PROCEDURE — 7220000101 HC DELIVERY VAGINAL/SINGLE

## 2024-05-27 PROCEDURE — 3700000156 HC EPIDURAL ANESTHESIA

## 2024-05-27 PROCEDURE — 6360000002 HC RX W HCPCS: Performed by: STUDENT IN AN ORGANIZED HEALTH CARE EDUCATION/TRAINING PROGRAM

## 2024-05-27 PROCEDURE — 36415 COLL VENOUS BLD VENIPUNCTURE: CPT

## 2024-05-27 PROCEDURE — 10H07YZ INSERTION OF OTHER DEVICE INTO PRODUCTS OF CONCEPTION, VIA NATURAL OR ARTIFICIAL OPENING: ICD-10-PCS | Performed by: OBSTETRICS & GYNECOLOGY

## 2024-05-27 PROCEDURE — 7100000001 HC PACU RECOVERY - ADDTL 15 MIN

## 2024-05-27 RX ORDER — SODIUM CHLORIDE 0.9 % (FLUSH) 0.9 %
5-40 SYRINGE (ML) INJECTION EVERY 12 HOURS SCHEDULED
Status: DISCONTINUED | OUTPATIENT
Start: 2024-05-27 | End: 2024-05-27

## 2024-05-27 RX ORDER — IBUPROFEN 400 MG/1
800 TABLET ORAL EVERY 8 HOURS SCHEDULED
Status: DISCONTINUED | OUTPATIENT
Start: 2024-05-27 | End: 2024-05-27

## 2024-05-27 RX ORDER — ONDANSETRON 4 MG/1
8 TABLET, ORALLY DISINTEGRATING ORAL EVERY 8 HOURS PRN
Status: DISCONTINUED | OUTPATIENT
Start: 2024-05-27 | End: 2024-05-29 | Stop reason: HOSPADM

## 2024-05-27 RX ORDER — SODIUM CHLORIDE, SODIUM LACTATE, POTASSIUM CHLORIDE, AND CALCIUM CHLORIDE .6; .31; .03; .02 G/100ML; G/100ML; G/100ML; G/100ML
1000 INJECTION, SOLUTION INTRAVENOUS PRN
Status: DISCONTINUED | OUTPATIENT
Start: 2024-05-27 | End: 2024-05-27

## 2024-05-27 RX ORDER — FENTANYL/BUPIVACAINE/NS/PF 2-1250MCG
1-15 PLASTIC BAG, INJECTION (ML) INJECTION CONTINUOUS
Status: DISCONTINUED | OUTPATIENT
Start: 2024-05-27 | End: 2024-05-27

## 2024-05-27 RX ORDER — SODIUM CHLORIDE 9 MG/ML
INJECTION, SOLUTION INTRAVENOUS PRN
Status: DISCONTINUED | OUTPATIENT
Start: 2024-05-27 | End: 2024-05-29 | Stop reason: HOSPADM

## 2024-05-27 RX ORDER — LIDOCAINE HCL/EPINEPHRINE/PF 2%-1:200K
VIAL (ML) INJECTION PRN
Status: DISCONTINUED | OUTPATIENT
Start: 2024-05-27 | End: 2024-05-27 | Stop reason: SDUPTHER

## 2024-05-27 RX ORDER — FENTANYL CITRATE 50 UG/ML
INJECTION, SOLUTION INTRAMUSCULAR; INTRAVENOUS
Status: DISCONTINUED
Start: 2024-05-27 | End: 2024-05-27

## 2024-05-27 RX ORDER — NALOXONE HYDROCHLORIDE 0.4 MG/ML
INJECTION, SOLUTION INTRAMUSCULAR; INTRAVENOUS; SUBCUTANEOUS PRN
Status: DISCONTINUED | OUTPATIENT
Start: 2024-05-27 | End: 2024-05-27

## 2024-05-27 RX ORDER — SODIUM CHLORIDE 0.9 % (FLUSH) 0.9 %
5-40 SYRINGE (ML) INJECTION PRN
Status: DISCONTINUED | OUTPATIENT
Start: 2024-05-27 | End: 2024-05-27

## 2024-05-27 RX ORDER — BUPIVACAINE HYDROCHLORIDE 2.5 MG/ML
INJECTION, SOLUTION EPIDURAL; INFILTRATION; INTRACAUDAL
Status: COMPLETED
Start: 2024-05-27 | End: 2024-05-27

## 2024-05-27 RX ORDER — OMEPRAZOLE 10 MG/1
10 CAPSULE, DELAYED RELEASE ORAL DAILY
COMMUNITY

## 2024-05-27 RX ORDER — ONDANSETRON 4 MG/1
4 TABLET, ORALLY DISINTEGRATING ORAL EVERY 6 HOURS PRN
Status: DISCONTINUED | OUTPATIENT
Start: 2024-05-27 | End: 2024-05-27

## 2024-05-27 RX ORDER — ONDANSETRON 2 MG/ML
4 INJECTION INTRAMUSCULAR; INTRAVENOUS EVERY 6 HOURS PRN
Status: DISCONTINUED | OUTPATIENT
Start: 2024-05-27 | End: 2024-05-27 | Stop reason: SDUPTHER

## 2024-05-27 RX ORDER — SODIUM CHLORIDE, SODIUM LACTATE, POTASSIUM CHLORIDE, AND CALCIUM CHLORIDE .6; .31; .03; .02 G/100ML; G/100ML; G/100ML; G/100ML
500 INJECTION, SOLUTION INTRAVENOUS PRN
Status: DISCONTINUED | OUTPATIENT
Start: 2024-05-27 | End: 2024-05-27

## 2024-05-27 RX ORDER — BUPIVACAINE HYDROCHLORIDE 2.5 MG/ML
INJECTION, SOLUTION EPIDURAL; INFILTRATION; INTRACAUDAL PRN
Status: DISCONTINUED | OUTPATIENT
Start: 2024-05-27 | End: 2024-05-27 | Stop reason: SDUPTHER

## 2024-05-27 RX ORDER — EPHEDRINE SULFATE 50 MG/ML
INJECTION INTRAVENOUS
Status: DISCONTINUED
Start: 2024-05-27 | End: 2024-05-27

## 2024-05-27 RX ORDER — ONDANSETRON 2 MG/ML
4 INJECTION INTRAMUSCULAR; INTRAVENOUS EVERY 6 HOURS PRN
Status: DISCONTINUED | OUTPATIENT
Start: 2024-05-27 | End: 2024-05-27

## 2024-05-27 RX ORDER — OXYCODONE HYDROCHLORIDE 5 MG/1
5 TABLET ORAL EVERY 4 HOURS PRN
Status: DISCONTINUED | OUTPATIENT
Start: 2024-05-27 | End: 2024-05-29 | Stop reason: HOSPADM

## 2024-05-27 RX ORDER — DOCUSATE SODIUM 100 MG/1
100 CAPSULE, LIQUID FILLED ORAL 2 TIMES DAILY PRN
Status: DISCONTINUED | OUTPATIENT
Start: 2024-05-27 | End: 2024-05-29 | Stop reason: HOSPADM

## 2024-05-27 RX ORDER — SEVOFLURANE 250 ML/250ML
1 LIQUID RESPIRATORY (INHALATION) CONTINUOUS PRN
Status: DISCONTINUED | OUTPATIENT
Start: 2024-05-27 | End: 2024-05-27

## 2024-05-27 RX ORDER — IBUPROFEN 400 MG/1
800 TABLET ORAL EVERY 8 HOURS SCHEDULED
Status: DISCONTINUED | OUTPATIENT
Start: 2024-05-27 | End: 2024-05-29 | Stop reason: HOSPADM

## 2024-05-27 RX ORDER — FENTANYL CITRATE 50 UG/ML
INJECTION, SOLUTION INTRAMUSCULAR; INTRAVENOUS PRN
Status: DISCONTINUED | OUTPATIENT
Start: 2024-05-27 | End: 2024-05-27 | Stop reason: SDUPTHER

## 2024-05-27 RX ORDER — ACETAMINOPHEN 500 MG
1000 TABLET ORAL EVERY 8 HOURS SCHEDULED
Status: DISCONTINUED | OUTPATIENT
Start: 2024-05-27 | End: 2024-05-29 | Stop reason: HOSPADM

## 2024-05-27 RX ORDER — CARBOPROST TROMETHAMINE 250 UG/ML
250 INJECTION, SOLUTION INTRAMUSCULAR PRN
Status: DISCONTINUED | OUTPATIENT
Start: 2024-05-27 | End: 2024-05-27

## 2024-05-27 RX ORDER — SODIUM CHLORIDE, SODIUM LACTATE, POTASSIUM CHLORIDE, CALCIUM CHLORIDE 600; 310; 30; 20 MG/100ML; MG/100ML; MG/100ML; MG/100ML
INJECTION, SOLUTION INTRAVENOUS CONTINUOUS
Status: DISCONTINUED | OUTPATIENT
Start: 2024-05-27 | End: 2024-05-27

## 2024-05-27 RX ORDER — TERBUTALINE SULFATE 1 MG/ML
0.25 INJECTION, SOLUTION SUBCUTANEOUS
Status: DISCONTINUED | OUTPATIENT
Start: 2024-05-27 | End: 2024-05-27

## 2024-05-27 RX ORDER — LIDOCAINE HCL/EPINEPHRINE/PF 2%-1:200K
VIAL (ML) INJECTION
Status: COMPLETED
Start: 2024-05-27 | End: 2024-05-27

## 2024-05-27 RX ORDER — SODIUM CHLORIDE, SODIUM LACTATE, POTASSIUM CHLORIDE, CALCIUM CHLORIDE 600; 310; 30; 20 MG/100ML; MG/100ML; MG/100ML; MG/100ML
INJECTION, SOLUTION INTRAVENOUS CONTINUOUS
Status: DISCONTINUED | OUTPATIENT
Start: 2024-05-27 | End: 2024-05-29 | Stop reason: HOSPADM

## 2024-05-27 RX ORDER — DIPHENHYDRAMINE HYDROCHLORIDE 50 MG/ML
12.5 INJECTION INTRAMUSCULAR; INTRAVENOUS EVERY 4 HOURS PRN
Status: DISCONTINUED | OUTPATIENT
Start: 2024-05-27 | End: 2024-05-27

## 2024-05-27 RX ORDER — SODIUM CHLORIDE 0.9 % (FLUSH) 0.9 %
5-40 SYRINGE (ML) INJECTION EVERY 12 HOURS SCHEDULED
Status: DISCONTINUED | OUTPATIENT
Start: 2024-05-27 | End: 2024-05-29 | Stop reason: HOSPADM

## 2024-05-27 RX ORDER — SODIUM CHLORIDE 9 MG/ML
25 INJECTION, SOLUTION INTRAVENOUS PRN
Status: DISCONTINUED | OUTPATIENT
Start: 2024-05-27 | End: 2024-05-27

## 2024-05-27 RX ORDER — SODIUM CHLORIDE 0.9 % (FLUSH) 0.9 %
5-40 SYRINGE (ML) INJECTION PRN
Status: DISCONTINUED | OUTPATIENT
Start: 2024-05-27 | End: 2024-05-29 | Stop reason: HOSPADM

## 2024-05-27 RX ORDER — ACETAMINOPHEN 325 MG/1
650 TABLET ORAL EVERY 4 HOURS PRN
Status: DISCONTINUED | OUTPATIENT
Start: 2024-05-27 | End: 2024-05-27

## 2024-05-27 RX ORDER — MISOPROSTOL 200 UG/1
400 TABLET ORAL PRN
Status: DISCONTINUED | OUTPATIENT
Start: 2024-05-27 | End: 2024-05-27

## 2024-05-27 RX ORDER — MODIFIED LANOLIN
OINTMENT (GRAM) TOPICAL PRN
Status: DISCONTINUED | OUTPATIENT
Start: 2024-05-27 | End: 2024-05-29 | Stop reason: HOSPADM

## 2024-05-27 RX ADMIN — OXYTOCIN 1 MILLI-UNITS/MIN: 10 INJECTION INTRAVENOUS at 11:42

## 2024-05-27 RX ADMIN — FENTANYL CITRATE 100 MCG: 50 INJECTION, SOLUTION INTRAMUSCULAR; INTRAVENOUS at 08:10

## 2024-05-27 RX ADMIN — SODIUM CHLORIDE, POTASSIUM CHLORIDE, SODIUM LACTATE AND CALCIUM CHLORIDE: 600; 310; 30; 20 INJECTION, SOLUTION INTRAVENOUS at 09:08

## 2024-05-27 RX ADMIN — DOCUSATE SODIUM 100 MG: 100 CAPSULE, LIQUID FILLED ORAL at 21:37

## 2024-05-27 RX ADMIN — IBUPROFEN 800 MG: 400 TABLET, FILM COATED ORAL at 18:33

## 2024-05-27 RX ADMIN — BUPIVACAINE HYDROCHLORIDE 7 ML: 2.5 INJECTION, SOLUTION EPIDURAL; INFILTRATION; INTRACAUDAL; PERINEURAL at 08:10

## 2024-05-27 RX ADMIN — ACETAMINOPHEN 1000 MG: 500 TABLET ORAL at 21:37

## 2024-05-27 RX ADMIN — SODIUM CHLORIDE, POTASSIUM CHLORIDE, SODIUM LACTATE AND CALCIUM CHLORIDE 1000 ML: 600; 310; 30; 20 INJECTION, SOLUTION INTRAVENOUS at 07:16

## 2024-05-27 RX ADMIN — LIDOCAINE HYDROCHLORIDE AND EPINEPHRINE 3 ML: 20; 5 INJECTION, SOLUTION EPIDURAL; INFILTRATION; INTRACAUDAL; PERINEURAL at 08:10

## 2024-05-27 RX ADMIN — Medication 10 ML/HR: at 08:25

## 2024-05-27 ASSESSMENT — ENCOUNTER SYMPTOMS
SHORTNESS OF BREATH: 0
DIARRHEA: 0
COUGH: 0
VOMITING: 0
NAUSEA: 1
ABDOMINAL PAIN: 1
SORE THROAT: 0

## 2024-05-27 ASSESSMENT — PAIN DESCRIPTION - DESCRIPTORS: DESCRIPTORS: SORE

## 2024-05-27 ASSESSMENT — PAIN - FUNCTIONAL ASSESSMENT: PAIN_FUNCTIONAL_ASSESSMENT: ACTIVITIES ARE NOT PREVENTED

## 2024-05-27 ASSESSMENT — PAIN SCALES - GENERAL: PAINLEVEL_OUTOF10: 2

## 2024-05-27 ASSESSMENT — PAIN DESCRIPTION - ORIENTATION: ORIENTATION: LOWER

## 2024-05-27 ASSESSMENT — PAIN DESCRIPTION - LOCATION: LOCATION: BACK;HIP

## 2024-05-27 NOTE — PROGRESS NOTES
0325:  patient of [facility] arrived to [facility] from home. SROM clear fluid around 0305. Patient confirms positive fetal movement and denies any vaginal bleeding    0334: CNM called to come assess patient.     0341: CNM Doran at bedside. History gathered     0347: Positive nitrazine     0349: SVE 2-3/50/high. Plan to admit to LD room 11 for labor      0354: Vertex confirmed by v scan     0407: Patient transferred up to LD room 11    0419: IV placed in L forearm.     0550: Patient requesting epidural when her partner returns from their house; she wants his support     0707: Patient requesting epidural at this time.    0716: Fluid bolus started for epidural     0730: Bedside and Verbal shift change report given to DAQUAN Weber RN (oncoming nurse) by CARMELO Tyler RN (offgoing nurse). Report included the following information Nurse Handoff Report, Adult Overview, MAR, Recent Results, Med Rec Status, and Event Log.

## 2024-05-27 NOTE — PROGRESS NOTES
Labor Progress Note  Patient seen, fetal heart rate and contraction pattern evaluated, patient examined.  BP (!) 105/58   Pulse 72   Temp 98.1 °F (36.7 °C) (Oral)   Resp 18   LMP 09/10/2023   SpO2 97%     Physical Exam:  Cervical Exam:  deferred  Membranes:  Spontaneous Rupture of Membranes; Amniotic Fluid: clear fluid  Uterine Activity: Frequency: Every 3-4 minutes, Duration: 60 seconds, and Intensity: moderate  Fetal Heart Rate: Baseline: 140 per minute  Variability: moderate  Accelerations: yes  Decelerations: none    Assessment/Plan:  Category 1 fht  Now comfortable with epidural  Anticipate

## 2024-05-27 NOTE — ANESTHESIA PRE PROCEDURE
\"HCGQUANT\"     ABGs: No results found for: \"PHART\", \"PO2ART\", \"DJO2RQO\", \"NVW9ODP\", \"BEART\", \"S1UXVEDH\"     Type & Screen (If Applicable):  No results found for: \"LABABO\"    Drug/Infectious Status (If Applicable):  No results found for: \"HIV\", \"HEPCAB\"    COVID-19 Screening (If Applicable): No results found for: \"COVID19\"        Anesthesia Evaluation  Patient summary reviewed and Nursing notes reviewed   no history of anesthetic complications:   Airway: Mallampati: II  TM distance: >3 FB   Neck ROM: full  Mouth opening: > = 3 FB   Dental: normal exam         Pulmonary:Negative Pulmonary ROS breath sounds clear to auscultation                             Cardiovascular:Negative CV ROS            Rhythm: regular                      Neuro/Psych:   Negative Neuro/Psych ROS              GI/Hepatic/Renal: Neg GI/Hepatic/Renal ROS           ROS comment: obesity.   Endo/Other: Negative Endo/Other ROS                     ROS comment: 37w gestation Abdominal:              PE comment: Deferred   Vascular: negative vascular ROS.         Other Findings:       Anesthesia Plan      epidural     ASA 3             Anesthetic plan and risks discussed with patient.    Use of blood products discussed with patient whom consented to blood products.      Attending anesthesiologist reviewed and agrees with Preprocedure content      Post-op pain plan if not by surgeon: epidural opioid        Elissa Carvalho MD   5/27/2024

## 2024-05-27 NOTE — ANESTHESIA PROCEDURE NOTES
Epidural Block    Patient location during procedure: OB  Start time: 5/27/2024 7:55 AM  End time: 5/27/2024 8:10 AM  Reason for block: labor epidural  Staffing  Performed: anesthesiologist   Anesthesiologist: Elissa Carvalho MD  Performed by: Elissa Carvalho MD  Authorized by: Elissa Carvalho MD    Epidural  Patient position: sitting  Prep: DuraPrep  Patient monitoring: cardiac monitor, continuous pulse ox and frequent blood pressure checks  Approach: midline  Location: L3-4  Injection technique: IFRAH saline  Provider prep: mask and sterile gloves  Needle  Needle type: Tuohy   Needle gauge: 17 G  Needle length: 3.5 in  Needle insertion depth: 7 cm  Catheter type: end hole  Catheter size: 18 G  Catheter at skin depth: 12 cm  Test dose: negativeCatheter Secured: tegaderm and tape  Assessment  Events: None  Hemodynamics: stable  Attempts: 2  Outcomes: uncomplicated and patient tolerated procedure well  Preanesthetic Checklist  Completed: patient identified, IV checked, site marked, risks and benefits discussed, surgical/procedural consents, equipment checked, pre-op evaluation, timeout performed, anesthesia consent given, oxygen available, monitors applied/VS acknowledged and fire risk safety assessment completed and verbalized

## 2024-05-27 NOTE — PROGRESS NOTES
Labor Progress Note  Patient seen, fetal heart rate and contraction pattern evaluated, patient examined.  BP (!) 97/51   Pulse 65   Temp 98 °F (36.7 °C) (Oral)   Resp 16   LMP 09/10/2023   SpO2 97%     Physical Exam:  Cervical Exam:  6 cm dilated    90% effaced    -2 station    Presenting Part: cephalic  Membranes:  Spontaneous Rupture of Membranes; Amniotic Fluid: clear fluid  Uterine Activity: Frequency: Every 2-5 minutes, Duration: 60 seconds, and Intensity: moderate  Fetal Heart Rate: Baseline: 135 per minute  Variability: moderate  Accelerations: yes  Decelerations: none    Pitocin at 4mu    Assessment/Plan:    Category 1 fht  Continue to titrate pitocin to adequate contraction pattern  Anticipate

## 2024-05-27 NOTE — ANESTHESIA POSTPROCEDURE EVALUATION
Department of Anesthesiology  Postprocedure Note    Patient: Alejandrina Huang  MRN: [id number]  YOB: 1995  Date of evaluation: 5/27/2024    Procedure Summary       Date: 05/27/24 Room / Location:     Anesthesia Start: 0755 Anesthesia Stop: 1420    Procedure: Labor Analgesia Diagnosis:     Scheduled Providers:  Responsible Provider: Micky Nagy DO    Anesthesia Type: epidural ASA Status: 3            Anesthesia Type: No value filed.    Bk Phase I:      Bk Phase II:      Anesthesia Post Evaluation    Patient location during evaluation: bedside  Patient participation: complete - patient participated  Level of consciousness: awake and alert  Pain score: 0  Airway patency: patent  Nausea & Vomiting: no nausea  Cardiovascular status: hemodynamically stable  Respiratory status: acceptable  Hydration status: euvolemic  Pain management: adequate    No notable events documented.

## 2024-05-27 NOTE — PROGRESS NOTES
Labor Progress Note  Patient seen, fetal heart rate and contraction pattern evaluated, patient examined.  /60   Pulse 59   Temp 98.1 °F (36.7 °C) (Oral)   Resp 18   LMP 09/10/2023   SpO2 97%     Physical Exam:  Cervical Exam:  5 cm dilated    70% effaced    -2 station    Membranes:  Spontaneous Rupture of Membranes; Amniotic Fluid: clear fluid  Uterine Activity: Frequency: Every 3-4 minutes, Duration: 60 seconds, and Intensity: moderate  Fetal Heart Rate: Baseline: 135 per minute  Variability: moderate  Accelerations: yes  Decelerations: none    Assessment/Plan:    IUPC placed due to difficulty monitoring contractions.  Anticipate

## 2024-05-27 NOTE — DISCHARGE INSTRUCTIONS
Postpartum Support Groups  We know that all of us are dealing with a tremendous amount of uncertainty, confusion and disruption to our daily lives, which may result in increased anxiety, depression and fear. If you are feeling unsettled or worse, please know that we are here to help. During this time of increased caution and care for one another, [facility] [place] ([facility]) is offering virtual support groups to ALL MOTHERS in [place] regardless of the age of your child/children as a way to help weather this emotional storm together. Social support is an important part of self-care during this time of physical distancing.  Virtual postpartum support group meetings available at www.postpartumva.org  Warm Line: 338.435.1262    Breastfeeding Support Groups   1st and 3rd Wednesday of each month at [facility]  2nd and 4th Tuesday of each month at [place]      [email or web address] [email or web address]-education-events           Vaginal Childbirth: Care Instructions  Overview     Vaginal birth means delivering a baby through the birth canal (vagina). During labor, the uterus tightens (contracts) regularly to thin and open the cervix and to push the baby out through the birth canal.  Your body will slowly heal in the next few weeks. It's easy to get too tired and overwhelmed during the first weeks after your baby is born. Changes in your hormones can shift your mood without warning. You may find it hard to meet the extra demands on your energy and time. Take it easy on yourself.  Follow-up care is a key part of your treatment and safety. Be sure to make and go to all appointments, and call your doctor if you are having problems. It's also a good idea to know your test results and keep a list of the medicines you take.  How can you care for yourself at home?  Vaginal bleeding and cramps  After delivery, you will have a bloody discharge from your vagina. This will turn pink within a week and then white or yellow

## 2024-05-27 NOTE — LACTATION NOTE
This note was copied from a baby's chart.  Initial Lactation Consultation - baby born vaginally today to a  mom at 37 weeks gestation. Mom states she attempted to breast feed her other children. 2 children nursed some but one child would not latch and never nursed. Mom states this baby has latched and nursed well a couple times.     I helped mom with a feeding this evening. We reviewed positioning the baby at the breast and how mom can help baby get a deep latch. We were able to get baby latched deeply in the cross cradle hold. Baby was sucking rhythmically with swallows noted.     Mom will continue to feed baby according to her feeding cues. She will not limit the time the baby is at the breast and will offer both breasts at each feeding.

## 2024-05-27 NOTE — PROGRESS NOTES
1530 Bedside and Verbal shift change report given to SELAM Jeffery RN (oncoming nurse) by FERNANDO Olmos RN (offgoing nurse). Report included the following information Nurse Handoff Report, Intake/Output, MAR, and Recent Results.       1656 TRANSFER - OUT REPORT:    Verbal report given to VELASQUEZ Clarke RN on Alejandrina Huang  being transferred to [facility] for routine progression of patient care       Report consisted of patient's Situation, Background, Assessment and   Recommendations(SBAR).     Information from the following report(s) Nurse Handoff Report, Intake/Output, MAR, and Recent Results was reviewed with the receiving nurse.           Lines:   Peripheral IV 05/27/24 Posterior;Left Forearm (Active)   Site Assessment Clean, dry & intact 05/27/24 0506   Line Status Blood return noted;Flushed;Normal saline locked 05/27/24 0506   Line Care Connections checked and tightened 05/27/24 0506   Phlebitis Assessment No symptoms 05/27/24 0506   Infiltration Assessment 0 05/27/24 0506   Alcohol Cap Used Yes 05/27/24 0506   Dressing Status New dressing applied;Clean, dry & intact 05/27/24 0506   Dressing Type Transparent 05/27/24 0506        Opportunity for questions and clarification was provided.      Patient transported with:  Registered Nurse

## 2024-05-27 NOTE — ED PROVIDER NOTES
[facility] History and Physical    Patient is a 29 y.o.  37w0d with victor m  who presents to the [facility] with c/o LOF and irregular contractions at 0325. She reports big gush of clear fluid at 0300 with continued leaking. Has had irregular contractions over the last few weeks and reports still irregular, but feeling a little stronger since her water broke. Lost her mucous plug, which was blood tinged over the last couple days, but no active VB. She reports good FM. Some nausea on the car ride here. Denies v/d, fever, cough, sore throat, SOB/difficulty breathing, loss of taste/smell, exposure to anyone with COVID, h/a, changes in vision, RUQ/epigastric pain. Denies dysuria, urgency, frequency, vaginal discharge, burning, itching, odor.     Prenatal care:      Provider: Dr. Gonzales         Hx TSVD x3, TT 7lbs     EDC  2024 by ultrasound equal to LMP     Anatomy Scan: 24: Face incomplete. 3/13/24: completed anatomy scan.   Gender: Female  Placenta Anterior; MARGINAL CORD INSERTION     Pertinent history: Obesity, history of retained placenta requiring D+C (G2); Chlamydia and Trich at initial OB visit. ANNE MARIE at 16 wks.   24: poly noted.         Lab Date Result  Initial Prenatal Labs         Blood Type 23  A Positive  Ab Screen 23 Negative  Hgb/Hct 23 13.0 / 38.0  Platelets 23 265  Hep B Surface Ag 23 Negative  HIV 23 Negative  Rubella Ab 23 Immune  T. Pallidium 23 Non-Reactive  TSH 24 0.84  Gonorrhea 23 Negative  Chlamydia 23 Positive  Trich 23 Positive  Pap Smear 23 Negative  Urine Culture 23 Positive         Chalmydia 24 Negative  Trich 24 Negative  Gonorrhea 24 Negative         Genetic Testing    Date Result  NIPT 2023 Low Risk  AFP 2024 Negative  Cystic Fibrosis      Hgb Electrophoresis             24-28 Week Labs    Date Result  Glucola 24 117  Hgb/Hct 24 10.8 /

## 2024-05-27 NOTE — PROGRESS NOTES
0730~  Bedside and Verbal shift change report given to DAQUAN Weber RN (oncoming nurse) by CARMELO Tyler RN (offgoing nurse). Report included the following information Nurse Handoff Report, Intake/Output, and Recent Results    0740~  Dr Carvalho called for epidural orders.    0750~  Dr Carvalho in room for epidural placement.    0849~  DAFNE Todd CNM in to meet patient.    1110~  DAFNE Todd CNM in room to assess tracing and talk to patient about Pitocin augmentation.    1400~  Patient called out feeling urge to push, SVE.  B TWAN Todd called for delivery.      1530~  Bedside and Verbal shift change report given to PAULETTE Jeffery RN (oncoming nurse) by DAQUAN Weber RN (offgoing nurse). Report included the following information Nurse Handoff Report, MAR, and Recent Results.

## 2024-05-27 NOTE — H&P
Labor and Delivery History and Physical  2024    Patient is a 29 y.o.  37w0d with victor m  who is now admitted to L&D for PROM and early labor. She initially presents to the [facility] with c/o LOF and irregular contractions at 0325. She reports big gush of clear fluid at 0300 with continued leaking. Has had irregular contractions over the last few weeks and reports still irregular, but feeling a little stronger since her water broke. Lost her mucous plug, which was blood tinged over the last couple days, but no active VB. She reports good FM. Some nausea on the car ride here. Denies v/d, fever, cough, sore throat, SOB/difficulty breathing, loss of taste/smell, exposure to anyone with COVID, h/a, changes in vision, RUQ/epigastric pain. Denies dysuria, urgency, frequency, vaginal discharge, burning, itching, odor.      Prenatal care:      Provider: Dr. Gonzales          Hx TSVD x3, TT 7lbs     EDC  2024 by ultrasound equal to LMP     Anatomy Scan: 24: Face incomplete. 3/13/24: completed anatomy scan.   Gender: Female  Placenta Anterior; MARGINAL CORD INSERTION     Pertinent history: Obesity, history of retained placenta requiring D+C (G2); Chlamydia and Trich at initial OB visit. ANNE MARIE at 16 wks.   24: poly noted.         Lab      Date     Result  Initial Prenatal Labs                             Blood Type      23           A Positive  Ab Screen       23          Negative  Hgb/Hct           23          13.0 / 38.0  Platelets          23          265  Hep B Surface Ag       23          Negative  HIV      23          Negative  Rubella Ab      23          Immune  T. Pallidium     23          Non-Reactive  TSH     24          0.84  Gonorrhea       23          Negative  Chlamydia       23          Positive  Trich    23          Positive  Pap Smear      23          Negative  Urine Culture   23          Positive

## 2024-05-27 NOTE — L&D DELIVERY NOTE
ERIN Huang [[id number]]      Labor Events      Cervical Ripening Date/Time:        Rupture Date/Time:  24 03:05:00   Rupture Type: SROM  Fluid Color: Clear  Fluid Odor: None              Anesthesia    Method: Epidural       Labor Event Times      Labor onset date/time:  24 03:15:00     Dilation complete date/time:  24 14:00:00     Start pushing date/time:  2024 14:10:00   Decision date/time (emergent ):            Delivery Details      Delivery Date: 24 Delivery Time: 14:20:00   Delivery Type: Vaginal, Spontaneous              [place] Presentation    Presentation: Vertex  Position: Left       Shoulder Dystocia    Shoulder Dystocia Present?: No       Assisted Delivery Details    Forceps Attempted?: No  Vacuum Extractor Attempted?: No                           Cord    Vessels: 3 Vessels  Complications: None  Delayed Cord Clamping?: Yes  Cord Clamped Date/Time: 2024 14:26:57  Cord Blood Disposition: Discard              Placenta    Date/Time: 2024 14:32:10  Removal: Spontaneous  Appearance: Intact  Disposition: Discarded       Lacerations    Episiotomy: None  Other Lacerations: no non-perineal laceration       Vaginal Counts    Initial Count Personnel: ABRAN WALLS  Initial Count Verified By: DAQUAN GARCIA RN  Intial Sponge Count: Correct Intial Needles Count: Correct Intial Instruments Count: Correct   Final Sponges Count: Correct Final Needles  Count: Correct Final Instruments Count: Correct   Final Count Personnel: DAFNE ALBRIGHT CNM  Final Count Verified By: DAQUAN GARCIA RN       Blood Loss  Mother: Alejandrina Huang #[id number]     Start of Mother's Information      Delivery Blood Loss  24 0315 - 24 1442      None                 End of Mother's Information  Mother: Alejandrina Huang #[id number]                Delivery Providers    Delivering clinician: Melissa Albright APRN - CNM     Provider Role    Melissa Albright APRN - CNM Obstetrician    Nickolas, Odliia CRUZ,

## 2024-05-27 NOTE — PROGRESS NOTES
Labor Progress Note  Patient seen, fetal heart rate and contraction pattern evaluated, patient examined.  /61   Pulse 67   Temp 97.6 °F (36.4 °C) (Oral)   Resp 18   LMP 09/10/2023   SpO2 97%     Physical Exam:  Cervical Exam:  deferred  Membranes:  Spontaneous Rupture of Membranes; Amniotic Fluid: clear fluid  Uterine Activity: Frequency: Every 3-6 minutes, Duration: 60 seconds, and Intensity: moderate  Fetal Heart Rate: Baseline: 135 per minute  Variability: moderate  Accelerations: yes  Decelerations: none    Assessment/Plan:    Contraction pattern has spaced out - will start pitocin augmentation  Category 1 fht  Anticipate

## 2024-05-28 PROCEDURE — 6370000000 HC RX 637 (ALT 250 FOR IP): Performed by: MIDWIFE

## 2024-05-28 PROCEDURE — 6370000000 HC RX 637 (ALT 250 FOR IP): Performed by: OBSTETRICS & GYNECOLOGY

## 2024-05-28 PROCEDURE — 1120000000 HC RM PRIVATE OB

## 2024-05-28 RX ADMIN — IBUPROFEN 800 MG: 400 TABLET, FILM COATED ORAL at 17:51

## 2024-05-28 RX ADMIN — OXYCODONE 5 MG: 5 TABLET ORAL at 01:06

## 2024-05-28 RX ADMIN — IBUPROFEN 800 MG: 400 TABLET, FILM COATED ORAL at 01:29

## 2024-05-28 RX ADMIN — ACETAMINOPHEN 1000 MG: 500 TABLET ORAL at 17:51

## 2024-05-28 RX ADMIN — OXYCODONE 5 MG: 5 TABLET ORAL at 20:54

## 2024-05-28 RX ADMIN — IBUPROFEN 800 MG: 400 TABLET, FILM COATED ORAL at 09:52

## 2024-05-28 RX ADMIN — DOCUSATE SODIUM 100 MG: 100 CAPSULE, LIQUID FILLED ORAL at 09:52

## 2024-05-28 RX ADMIN — ACETAMINOPHEN 1000 MG: 500 TABLET ORAL at 09:52

## 2024-05-28 RX ADMIN — OXYCODONE 5 MG: 5 TABLET ORAL at 10:08

## 2024-05-28 ASSESSMENT — PAIN DESCRIPTION - ORIENTATION
ORIENTATION: LOWER
ORIENTATION: LOWER

## 2024-05-28 ASSESSMENT — PAIN SCALES - GENERAL
PAINLEVEL_OUTOF10: 6
PAINLEVEL_OUTOF10: 7

## 2024-05-28 ASSESSMENT — PAIN - FUNCTIONAL ASSESSMENT
PAIN_FUNCTIONAL_ASSESSMENT: ACTIVITIES ARE NOT PREVENTED
PAIN_FUNCTIONAL_ASSESSMENT: ACTIVITIES ARE NOT PREVENTED

## 2024-05-28 ASSESSMENT — PAIN DESCRIPTION - DESCRIPTORS: DESCRIPTORS: CRAMPING;SORE

## 2024-05-28 ASSESSMENT — PAIN DESCRIPTION - LOCATION
LOCATION: ABDOMEN;BACK
LOCATION: ABDOMEN;BACK

## 2024-05-28 NOTE — PROGRESS NOTES
Post-Partum Day Number 1 Progress Note    Alejandrina Huang     Assessment: Doing well, post partum day 1    Plan:  1. Continue routine postpartum and perineal care as well as maternal education.  2. Baby girl Desiree doing well  3. N/A     Information for the patient's :  ERIN Huang [[id number]]   Vaginal, Spontaneous      Patient doing well without significant complaint.  Voiding without difficulty, normal lochia. Has already had bowel movements. Pain controlled on ibuprofen, needed occasional roxicodone overnight.     Vitals:  /78   Pulse 64   Temp 97.7 °F (36.5 °C) (Oral)   Resp 16   LMP 09/10/2023   SpO2 97%   Breastfeeding Unknown   Temp (24hrs), Av.9 °F (36.6 °C), Min:97.6 °F (36.4 °C), Max:98.1 °F (36.7 °C)    Exam:   Patient without distress.                Abdomen soft, fundus firm, nontender                Perineum with normal lochia noted.                Lower extremities are negative for swelling, cords or tenderness.    Labs:     Lab Results   Component Value Date/Time    WBC 8.9 2024 04:36 AM    WBC 6.3 2024 02:41 PM    WBC 8.6 2023 09:08 AM    HGB 10.7 2024 04:36 AM    HGB 10.8 2024 02:41 PM    HGB 13.0 2023 09:08 AM    HCT 31.5 2024 04:36 AM    HCT 33.1 2024 02:41 PM    HCT 38.0 2023 09:08 AM     2024 04:36 AM     2024 02:41 PM     2023 09:08 AM       No results found for this or any previous visit (from the past 24 hour(s)).

## 2024-05-28 NOTE — LACTATION NOTE
This note was copied from a baby's chart.  Infant nursing at the time of my visit; assisted mom with maintaining a deeper latch. Infant noted to have a visible frenulum near the gumline, but is able to maintain the latch. Mom will continue to nurse infant at least every 3 hours or in response to feeding cues.

## 2024-05-29 VITALS
DIASTOLIC BLOOD PRESSURE: 76 MMHG | SYSTOLIC BLOOD PRESSURE: 121 MMHG | TEMPERATURE: 98.3 F | HEART RATE: 87 BPM | RESPIRATION RATE: 16 BRPM | OXYGEN SATURATION: 98 %

## 2024-05-29 PROCEDURE — 6370000000 HC RX 637 (ALT 250 FOR IP): Performed by: OBSTETRICS & GYNECOLOGY

## 2024-05-29 PROCEDURE — 6370000000 HC RX 637 (ALT 250 FOR IP): Performed by: MIDWIFE

## 2024-05-29 RX ORDER — FUROSEMIDE 20 MG/1
20 TABLET ORAL
Status: COMPLETED | OUTPATIENT
Start: 2024-05-29 | End: 2024-05-29

## 2024-05-29 RX ADMIN — IBUPROFEN 800 MG: 400 TABLET, FILM COATED ORAL at 01:57

## 2024-05-29 RX ADMIN — ACETAMINOPHEN 1000 MG: 500 TABLET ORAL at 10:17

## 2024-05-29 RX ADMIN — IBUPROFEN 800 MG: 400 TABLET, FILM COATED ORAL at 10:17

## 2024-05-29 RX ADMIN — FUROSEMIDE 20 MG: 20 TABLET ORAL at 10:17

## 2024-05-29 RX ADMIN — ACETAMINOPHEN 1000 MG: 500 TABLET ORAL at 01:57

## 2024-05-29 NOTE — LACTATION NOTE
This note was copied from a baby's chart.  Mother reports that baby has been nursing well and also cluster feeding tonight. Mother expressed some concern about baby's visible frenulum. Discussed issues that can occur with tight frenulum including weight gain issues for baby and sore\damaged nipples for mother. Advised mother if any of these issues occur to have baby seen by a pediatric dentist. Mother will continue to feed baby on demand. Discharge weight loss 8.1%. Provided mother with hand pump and demonstrated use. Mother will pump breasts for further stimulation and top off baby with EBM.

## 2024-05-29 NOTE — DISCHARGE SUMMARY
Obstetrical Discharge Summary     Name: Alejandrina Huang MRN: [id number]  SSN: [id number]    YOB: 1995  Age: 29 y.o.  Sex: female      Admit Date: 2024    Discharge Date: 2024     Admitting Physician: Theodore Giron MD     Attending Physician:  Henrique Gonzales MD     Admission Diagnoses: Full-term premature rupture of membranes with onset of labor within 24 hours of rupture [O42.02]  37 weeks gestation of pregnancy [Z3A.37]  Abdominal pain during pregnancy in third trimester [O26.893, R10.9]    Discharge Diagnoses:   Information for the patient's :  ERIN Huang [[id number]]   @[id number]@      Additional Diagnoses:  No components found for: \"OBEXTABORH\", \"OBEXTABSCRN\", \"OBEXTRUBELLA\", \"OBEXTGRBS\"    Hospital Course: Normal hospital course following the delivery.    Disposition at Discharge: Home or self care    Discharged Condition: Stable    Patient Instructions:   Current Discharge Medication List        CONTINUE these medications which have NOT CHANGED    Details   omeprazole (PRILOSEC) 10 MG delayed release capsule Take 1 capsule by mouth daily      Prenatal Vit-Fe Fumarate-FA (PRENATAL PO) Take by mouth           STOP taking these medications       aspirin 81 MG EC tablet Comments:   Reason for Stopping:         ondansetron (ZOFRAN-ODT) 4 MG disintegrating tablet Comments:   Reason for Stopping:         famotidine (PEPCID) 20 MG tablet Comments:   Reason for Stopping:               Reference my discharge instructions.    No follow-ups on file.     Signed By:  Caryn Michel MD     May 29, 2024

## 2024-05-29 NOTE — PROGRESS NOTES
Post-Partum Day Number 2 Progress Note    Alejandrina Huang     Assessment: Doing well, post partum day 2    Plan:   1. Discharge home today  2. Follow up in office in 6 weeks with Henrique Gonzales MD  3. Post partum activity advised, diet as tolerated  4.  Due to persistent GI concerns, patient encouraged to follow up with GI specialist  5. Due to mild LE edema and borderline BP overnight, single po lasix prior to discharge home    Information for the patient's :  Reina, GIRL Alejandrina [[id number]]   Vaginal, Spontaneous  Patient doing well without significant complaint.  Voiding without difficulty, normal lochia.    Vitals:  /84   Pulse 71   Temp 97.9 °F (36.6 °C) (Oral)   Resp 16   LMP 09/10/2023   SpO2 97%   Breastfeeding Unknown   Temp (24hrs), Av.8 °F (36.6 °C), Min:97.5 °F (36.4 °C), Max:98 °F (36.7 °C)      Exam:         Patient without distress.                 Abdomen soft, fundus firm, nontender                 Lower extremities are negative for swelling, cords or tenderness.    Labs:     Lab Results   Component Value Date/Time    WBC 8.9 2024 04:36 AM    WBC 6.3 2024 02:41 PM    WBC 8.6 2023 09:08 AM    HGB 10.7 2024 04:36 AM    HGB 10.8 2024 02:41 PM    HGB 13.0 2023 09:08 AM    HCT 31.5 2024 04:36 AM    HCT 33.1 2024 02:41 PM    HCT 38.0 2023 09:08 AM     2024 04:36 AM     2024 02:41 PM     2023 09:08 AM       No results found for this or any previous visit (from the past 24 hour(s)).

## 2024-07-10 ENCOUNTER — POSTPARTUM VISIT (OUTPATIENT)
Age: 29
End: 2024-07-10
Payer: MEDICAID

## 2024-07-10 VITALS — DIASTOLIC BLOOD PRESSURE: 78 MMHG | SYSTOLIC BLOOD PRESSURE: 126 MMHG | BODY MASS INDEX: 34.5 KG/M2 | WEIGHT: 201 LBS

## 2024-07-10 DIAGNOSIS — Z30.017 INSERTION OF NEXPLANON: ICD-10-CM

## 2024-07-10 PROBLEM — Z3A.08 8 WEEKS GESTATION OF PREGNANCY: Status: RESOLVED | Noted: 2023-11-08 | Resolved: 2024-07-10

## 2024-07-10 PROBLEM — O40.3XX0 POLYHYDRAMNIOS IN THIRD TRIMESTER: Status: RESOLVED | Noted: 2024-05-22 | Resolved: 2024-07-10

## 2024-07-10 PROBLEM — Z3A.37 37 WEEKS GESTATION OF PREGNANCY: Status: RESOLVED | Noted: 2024-05-27 | Resolved: 2024-07-10

## 2024-07-10 PROBLEM — O43.199 MARGINAL INSERTION OF UMBILICAL CORD AFFECTING MANAGEMENT OF MOTHER: Status: RESOLVED | Noted: 2024-02-28 | Resolved: 2024-07-10

## 2024-07-10 LAB
HCG, PREGNANCY, URINE, POC: NEGATIVE
VALID INTERNAL CONTROL, POC: YES

## 2024-07-10 PROCEDURE — 0503F POSTPARTUM CARE VISIT: CPT | Performed by: OBSTETRICS & GYNECOLOGY

## 2024-07-10 PROCEDURE — 81025 URINE PREGNANCY TEST: CPT | Performed by: OBSTETRICS & GYNECOLOGY

## 2024-07-10 PROCEDURE — 11981 INSERTION DRUG DLVR IMPLANT: CPT | Performed by: OBSTETRICS & GYNECOLOGY

## 2024-07-10 NOTE — PROGRESS NOTES
Postpartum Visit      Chief Complaint:   Chief Complaint   Patient presents with    Postpartum Care        HPI:    Alejandrina Huang is a 29 y.o. female presents for her postpartum office visit after she delivered a cha pregnancy by a normal spontaneous vaginal delivery on 24. Delivery was complicated by nothing. Pregnancy was complicated by Obesity and marginal cord insertion .     Patient reports normal healing after delivery. She notes she is receiving  adequate help and support at home. Patient is interested in birth control.     [place] postpartum depression screening result 5.     Pt started her menses.     Baby Information    The baby weighed 6#11 at birth. Patient is Nursing.     Vitals:     Vitals:    07/10/24 1424   BP: 126/78        Exam:    General: No acute distress  HEENT: Normocephalic  Chest: Breathing non labored  Cardiovascular: Regular rate  Abdomen: soft, non tender, no masses.    :   External: Normal labia without defect  Vagina: Normal vaginal opening noted.     Cervix: Normal on palpation  Uterus:  Appropriate involution noted.        Assessment:    Alejandrina Huang is a 29 y.o.  female s/p normal spontaneous vaginal delivery here for postpartum care. Her postpartum course has been normal.     Plan:     - Patient is cleared to go back to work and to resume her normal physical activities  - Discussed postpartum birth control and patient opts for William Gonzales MD

## 2024-07-10 NOTE — PROGRESS NOTES
Alejandrina Huang is a 29 y.o. female returns for a routine post-partum follow-up visit     Chief Complaint   Patient presents with    Postpartum Care       Postpartum Depression: Low Risk  (2024)    [place]  Depression Scale     Last EPDS Total Score: 3     Last EPDS Self Harm Result: Never           Type of delivery: normal spontaneous vaginal delivery  Date of Delivery: May 27, 2024  Breastfeeding: yes  Bleeding Resolved: yes  Birth Control: none.  Last Pap: normal obtained 23         Problems: problems - had intercourse on  and began bleeding after.  Pt still bleeding.      1. Have you been to the ER, urgent care clinic, or hospitalized since your last visit? No    2. Have you seen or consulted any other health care providers outside of the [facility] since your last visit? No    Examination chaperoned by DELROY DEWEY LPN.     Alejandrina Huang is a 29 y.o. female presents for     Chief Complaint   Patient presents with    Postpartum Care                   Device number [id number], expiration date 2025    Chart reviewed for the following:   MACO KEMP ERIKA, LPN, have reviewed the History, Physical and updated the Allergic reactions for Alejandrina Huang     TIME OUT performed immediately prior to start of procedure:   MACO KEMP ERIKA, LPN, have performed the following reviews on Alejandrina Huang prior to the start of the procedure:            * Patient was identified by name and date of birth   * Agreement on procedure being performed was verified  * Risks and Benefits explained to the patient  * Procedure site verified and marked as necessary  * Patient was positioned for comfort  * Consent was signed and verified     Time: 243 pm    Date of procedure: 7/10/2024    Procedure performed by:  Henrique Gonzales MD         Patient assisted by: self    How tolerated by patient: tolerated the procedure well with no complications    Post Procedural Pain Scale: 0 - No

## 2024-07-10 NOTE — PROGRESS NOTES
Nexplanon Insertion    Chief Complaint: Nexplanon insertion    Indication:    Alejandrina Huang is a 29 y.o.  female presenting for Nexplanon insertion. Her LMP was on Patient's last menstrual period was 09/10/2023.. Patient has been given the handout for Nexplanon to review and her questions have been answered. Risks and benefits reviewed, including bleeding profile for Nexplanon and need for condom use to prevent sexually transmitted infections. Patient informed that the Nexplanon is effective for up to 3 years and will need to be removed after this time period.     Urine pregnancy test: negative      Procedure Note:     Written and verbal informed consent obtained, risks discussed included: bleeding, irregular menses, infection, and pain/discomfort,. The appropriate timeout was taken.    After a brief TIME OUT, patient's dominant hand is her left hand and therefore the Nexplanon was placed in her right non dominant hand per device insertions. The patient was placed supine on the bed and her right arm was flexed at the elbow and externally rotated so that her hand was underneath her head (or as close as possible). The insertion site was identified on the inner side of the non-dominant upper arm, 8 -10 cm from medial epicondyle of the humerus and 3-5 cm posterior to the sulcus (groove) between the biceps and triceps muscles. The insertion site was marked. The skin was cleaned from the insertion site to the guiding ladonna with an betadine antiseptic solution (Betadine / Chloraprep) and the insertion site was injected with 3 cc of 1% lidocaine (without epinephrine) just under the skin along the planned insertion tunnel. Anesthesia confirmed.    Next, the skin was punctured with the tip of the Nexplanon trocar needle slightly angled less than 30°. The needle was inserted until the bevel (slanted opening of the tip) was just under the skin (and no further). The applicator was then lowered to a horizontal position.

## 2025-02-20 ENCOUNTER — TELEPHONE (OUTPATIENT)
Age: 30
End: 2025-02-20

## 2025-02-20 NOTE — TELEPHONE ENCOUNTER
Two patient identifers    Pt had nexplanon placed 7/10/2024, she states she has had heart palpitations, mood changes, bleeding after intercourse as well as other irregular bleeding, decreased appetite and increased acne.  She is concerned that she is having a reaction to the nexplanon and wishes to get it removed.    Pt placed on schedule.  Pt given ER precautions for symptoms.     MD advised

## 2025-02-21 ENCOUNTER — TELEPHONE (OUTPATIENT)
Age: 30
End: 2025-02-21

## 2025-02-21 NOTE — TELEPHONE ENCOUNTER
Pt of Dr. Gonzales's      Pt is wishing to have nexplanon removed due to symptoms she has been experiencing (heart palpitations, mood changes, aub, increase acne)  She was seeing Dr. Gonzales for her prenatal care but would like to do the removal at the [place] location because she has moved       [employer] message sent to patient to see if she wanted to get nexplanon out sooner with Dr. Gonzales or wait for Jennifer to have availability.

## 2025-09-04 ENCOUNTER — PROCEDURE VISIT (OUTPATIENT)
Age: 30
End: 2025-09-04

## 2025-09-04 VITALS
OXYGEN SATURATION: 98 % | DIASTOLIC BLOOD PRESSURE: 77 MMHG | TEMPERATURE: 98.5 F | HEIGHT: 64 IN | SYSTOLIC BLOOD PRESSURE: 107 MMHG | RESPIRATION RATE: 14 BRPM | HEART RATE: 79 BPM | WEIGHT: 182 LBS | BODY MASS INDEX: 31.07 KG/M2

## 2025-09-04 DIAGNOSIS — Z30.46 NEXPLANON REMOVAL: Primary | ICD-10-CM

## 2025-09-04 RX ORDER — DROSPIRENONE AND ETHINYL ESTRADIOL 0.02-3(28)
1 KIT ORAL DAILY
Qty: 3 PACKET | Refills: 4 | Status: SHIPPED | OUTPATIENT
Start: 2025-09-04

## 2025-09-04 SDOH — ECONOMIC STABILITY: FOOD INSECURITY: WITHIN THE PAST 12 MONTHS, THE FOOD YOU BOUGHT JUST DIDN'T LAST AND YOU DIDN'T HAVE MONEY TO GET MORE.: NEVER TRUE

## 2025-09-04 SDOH — ECONOMIC STABILITY: FOOD INSECURITY: WITHIN THE PAST 12 MONTHS, YOU WORRIED THAT YOUR FOOD WOULD RUN OUT BEFORE YOU GOT MONEY TO BUY MORE.: NEVER TRUE

## 2025-09-04 ASSESSMENT — PATIENT HEALTH QUESTIONNAIRE - PHQ9
3. TROUBLE FALLING OR STAYING ASLEEP: NEARLY EVERY DAY
6. FEELING BAD ABOUT YOURSELF - OR THAT YOU ARE A FAILURE OR HAVE LET YOURSELF OR YOUR FAMILY DOWN: NEARLY EVERY DAY
2. FEELING DOWN, DEPRESSED OR HOPELESS: SEVERAL DAYS
8. MOVING OR SPEAKING SO SLOWLY THAT OTHER PEOPLE COULD HAVE NOTICED. OR THE OPPOSITE, BEING SO FIGETY OR RESTLESS THAT YOU HAVE BEEN MOVING AROUND A LOT MORE THAN USUAL: SEVERAL DAYS
4. FEELING TIRED OR HAVING LITTLE ENERGY: NEARLY EVERY DAY
SUM OF ALL RESPONSES TO PHQ QUESTIONS 1-9: 17
10. IF YOU CHECKED OFF ANY PROBLEMS, HOW DIFFICULT HAVE THESE PROBLEMS MADE IT FOR YOU TO DO YOUR WORK, TAKE CARE OF THINGS AT HOME, OR GET ALONG WITH OTHER PEOPLE: SOMEWHAT DIFFICULT
9. THOUGHTS THAT YOU WOULD BE BETTER OFF DEAD, OR OF HURTING YOURSELF: NOT AT ALL
7. TROUBLE CONCENTRATING ON THINGS, SUCH AS READING THE NEWSPAPER OR WATCHING TELEVISION: SEVERAL DAYS
SUM OF ALL RESPONSES TO PHQ QUESTIONS 1-9: 17
5. POOR APPETITE OR OVEREATING: NEARLY EVERY DAY
SUM OF ALL RESPONSES TO PHQ QUESTIONS 1-9: 17
6. FEELING BAD ABOUT YOURSELF - OR THAT YOU ARE A FAILURE OR HAVE LET YOURSELF OR YOUR FAMILY DOWN: NEARLY EVERY DAY
2. FEELING DOWN, DEPRESSED OR HOPELESS: SEVERAL DAYS
1. LITTLE INTEREST OR PLEASURE IN DOING THINGS: MORE THAN HALF THE DAYS
SUM OF ALL RESPONSES TO PHQ QUESTIONS 1-9: 17
SUM OF ALL RESPONSES TO PHQ QUESTIONS 1-9: 17
3. TROUBLE FALLING OR STAYING ASLEEP: NEARLY EVERY DAY
7. TROUBLE CONCENTRATING ON THINGS, SUCH AS READING THE NEWSPAPER OR WATCHING TELEVISION: SEVERAL DAYS
9. THOUGHTS THAT YOU WOULD BE BETTER OFF DEAD, OR OF HURTING YOURSELF: NOT AT ALL
SUM OF ALL RESPONSES TO PHQ QUESTIONS 1-9: 17
10. IF YOU CHECKED OFF ANY PROBLEMS, HOW DIFFICULT HAVE THESE PROBLEMS MADE IT FOR YOU TO DO YOUR WORK, TAKE CARE OF THINGS AT HOME, OR GET ALONG WITH OTHER PEOPLE: SOMEWHAT DIFFICULT
9. THOUGHTS THAT YOU WOULD BE BETTER OFF DEAD, OR OF HURTING YOURSELF: NOT AT ALL
8. MOVING OR SPEAKING SO SLOWLY THAT OTHER PEOPLE COULD HAVE NOTICED. OR THE OPPOSITE, BEING SO FIGETY OR RESTLESS THAT YOU HAVE BEEN MOVING AROUND A LOT MORE THAN USUAL: SEVERAL DAYS
SUM OF ALL RESPONSES TO PHQ QUESTIONS 1-9: 17
SUM OF ALL RESPONSES TO PHQ QUESTIONS 1-9: 17
1. LITTLE INTEREST OR PLEASURE IN DOING THINGS: MORE THAN HALF THE DAYS
5. POOR APPETITE OR OVEREATING: NEARLY EVERY DAY
4. FEELING TIRED OR HAVING LITTLE ENERGY: NEARLY EVERY DAY